# Patient Record
Sex: MALE | Race: WHITE | Employment: OTHER | URBAN - METROPOLITAN AREA
[De-identification: names, ages, dates, MRNs, and addresses within clinical notes are randomized per-mention and may not be internally consistent; named-entity substitution may affect disease eponyms.]

---

## 2022-03-01 ENCOUNTER — APPOINTMENT (OUTPATIENT)
Dept: GENERAL RADIOLOGY | Age: 87
DRG: 522 | End: 2022-03-01
Payer: MEDICARE

## 2022-03-01 ENCOUNTER — HOSPITAL ENCOUNTER (INPATIENT)
Age: 87
LOS: 6 days | Discharge: NURSING, REHAB OR LONG-TERM CARE | DRG: 522 | End: 2022-03-07
Attending: STUDENT IN AN ORGANIZED HEALTH CARE EDUCATION/TRAINING PROGRAM | Admitting: ORTHOPAEDIC SURGERY
Payer: MEDICARE

## 2022-03-01 DIAGNOSIS — S72.001A CLOSED FRACTURE OF RIGHT HIP, INITIAL ENCOUNTER (HCC): Primary | ICD-10-CM

## 2022-03-01 LAB
ABSOLUTE EOS #: 0.1 K/UL (ref 0–0.4)
ABSOLUTE LYMPH #: 0.9 K/UL (ref 1–4.8)
ABSOLUTE MONO #: 1.1 K/UL (ref 0.1–1.3)
ANION GAP SERPL CALCULATED.3IONS-SCNC: 10 MMOL/L (ref 9–17)
BASOPHILS # BLD: 1 % (ref 0–2)
BASOPHILS ABSOLUTE: 0 K/UL (ref 0–0.2)
BUN BLDV-MCNC: 18 MG/DL (ref 8–23)
CALCIUM SERPL-MCNC: 8.5 MG/DL (ref 8.6–10.4)
CHLORIDE BLD-SCNC: 105 MMOL/L (ref 98–107)
CO2: 25 MMOL/L (ref 20–31)
CREAT SERPL-MCNC: 1.23 MG/DL (ref 0.7–1.2)
EOSINOPHILS RELATIVE PERCENT: 1 % (ref 0–4)
GFR AFRICAN AMERICAN: >60 ML/MIN
GFR NON-AFRICAN AMERICAN: 55 ML/MIN
GFR SERPL CREATININE-BSD FRML MDRD: ABNORMAL ML/MIN/{1.73_M2}
GLUCOSE BLD-MCNC: 133 MG/DL (ref 70–99)
HCT VFR BLD CALC: 37.8 % (ref 41–53)
HEMOGLOBIN: 12.6 G/DL (ref 13.5–17.5)
LYMPHOCYTES # BLD: 8 % (ref 24–44)
MCH RBC QN AUTO: 31.9 PG (ref 26–34)
MCHC RBC AUTO-ENTMCNC: 33.4 G/DL (ref 31–37)
MCV RBC AUTO: 95.3 FL (ref 80–100)
MONOCYTES # BLD: 10 % (ref 1–7)
PDW BLD-RTO: 13.2 % (ref 11.5–14.9)
PLATELET # BLD: 219 K/UL (ref 150–450)
PMV BLD AUTO: 7.4 FL (ref 6–12)
POTASSIUM SERPL-SCNC: 4.7 MMOL/L (ref 3.7–5.3)
RBC # BLD: 3.97 M/UL (ref 4.5–5.9)
SARS-COV-2, RAPID: NOT DETECTED
SEG NEUTROPHILS: 80 % (ref 36–66)
SEGMENTED NEUTROPHILS ABSOLUTE COUNT: 8.6 K/UL (ref 1.3–9.1)
SODIUM BLD-SCNC: 140 MMOL/L (ref 135–144)
SPECIMEN DESCRIPTION: NORMAL
WBC # BLD: 10.7 K/UL (ref 3.5–11)

## 2022-03-01 PROCEDURE — 99285 EMERGENCY DEPT VISIT HI MDM: CPT

## 2022-03-01 PROCEDURE — 36415 COLL VENOUS BLD VENIPUNCTURE: CPT

## 2022-03-01 PROCEDURE — 6360000002 HC RX W HCPCS: Performed by: STUDENT IN AN ORGANIZED HEALTH CARE EDUCATION/TRAINING PROGRAM

## 2022-03-01 PROCEDURE — 87635 SARS-COV-2 COVID-19 AMP PRB: CPT

## 2022-03-01 PROCEDURE — 96374 THER/PROPH/DIAG INJ IV PUSH: CPT

## 2022-03-01 PROCEDURE — 93005 ELECTROCARDIOGRAM TRACING: CPT | Performed by: STUDENT IN AN ORGANIZED HEALTH CARE EDUCATION/TRAINING PROGRAM

## 2022-03-01 PROCEDURE — 2580000003 HC RX 258: Performed by: ORTHOPAEDIC SURGERY

## 2022-03-01 PROCEDURE — 80048 BASIC METABOLIC PNL TOTAL CA: CPT

## 2022-03-01 PROCEDURE — 85025 COMPLETE CBC W/AUTO DIFF WBC: CPT

## 2022-03-01 PROCEDURE — 6370000000 HC RX 637 (ALT 250 FOR IP): Performed by: ORTHOPAEDIC SURGERY

## 2022-03-01 PROCEDURE — 71045 X-RAY EXAM CHEST 1 VIEW: CPT

## 2022-03-01 PROCEDURE — 1200000000 HC SEMI PRIVATE

## 2022-03-01 PROCEDURE — 6370000000 HC RX 637 (ALT 250 FOR IP): Performed by: NURSE PRACTITIONER

## 2022-03-01 PROCEDURE — 73502 X-RAY EXAM HIP UNI 2-3 VIEWS: CPT

## 2022-03-01 RX ORDER — OXYCODONE HYDROCHLORIDE 5 MG/1
5 TABLET ORAL EVERY 4 HOURS PRN
Status: DISCONTINUED | OUTPATIENT
Start: 2022-03-01 | End: 2022-03-04

## 2022-03-01 RX ORDER — ACETAMINOPHEN 500 MG
1000 TABLET ORAL EVERY 6 HOURS
Status: DISCONTINUED | OUTPATIENT
Start: 2022-03-01 | End: 2022-03-08 | Stop reason: HOSPADM

## 2022-03-01 RX ORDER — FENTANYL CITRATE 50 UG/ML
25 INJECTION, SOLUTION INTRAMUSCULAR; INTRAVENOUS ONCE
Status: COMPLETED | OUTPATIENT
Start: 2022-03-01 | End: 2022-03-01

## 2022-03-01 RX ORDER — CHLORAL HYDRATE 500 MG
1000 CAPSULE ORAL DAILY
COMMUNITY

## 2022-03-01 RX ORDER — ONDANSETRON 2 MG/ML
4 INJECTION INTRAMUSCULAR; INTRAVENOUS EVERY 6 HOURS PRN
Status: DISCONTINUED | OUTPATIENT
Start: 2022-03-01 | End: 2022-03-08 | Stop reason: HOSPADM

## 2022-03-01 RX ORDER — ONDANSETRON 4 MG/1
4 TABLET, ORALLY DISINTEGRATING ORAL EVERY 8 HOURS PRN
Status: DISCONTINUED | OUTPATIENT
Start: 2022-03-01 | End: 2022-03-08 | Stop reason: HOSPADM

## 2022-03-01 RX ORDER — ATORVASTATIN CALCIUM 80 MG/1
80 TABLET, FILM COATED ORAL NIGHTLY
COMMUNITY

## 2022-03-01 RX ORDER — TAMSULOSIN HYDROCHLORIDE 0.4 MG/1
0.8 CAPSULE ORAL NIGHTLY
Status: DISCONTINUED | OUTPATIENT
Start: 2022-03-01 | End: 2022-03-03

## 2022-03-01 RX ORDER — AMLODIPINE BESYLATE 2.5 MG/1
2.5 TABLET ORAL DAILY
Status: DISCONTINUED | OUTPATIENT
Start: 2022-03-02 | End: 2022-03-08 | Stop reason: HOSPADM

## 2022-03-01 RX ORDER — FENTANYL CITRATE 50 UG/ML
50 INJECTION, SOLUTION INTRAMUSCULAR; INTRAVENOUS ONCE
Status: COMPLETED | OUTPATIENT
Start: 2022-03-01 | End: 2022-03-01

## 2022-03-01 RX ORDER — FINASTERIDE 5 MG/1
5 TABLET, FILM COATED ORAL DAILY
COMMUNITY

## 2022-03-01 RX ORDER — SODIUM CHLORIDE 0.9 % (FLUSH) 0.9 %
5-40 SYRINGE (ML) INJECTION PRN
Status: DISCONTINUED | OUTPATIENT
Start: 2022-03-01 | End: 2022-03-08 | Stop reason: HOSPADM

## 2022-03-01 RX ORDER — M-VIT,TX,IRON,MINS/CALC/FOLIC 27MG-0.4MG
1 TABLET ORAL DAILY
COMMUNITY

## 2022-03-01 RX ORDER — AMLODIPINE BESYLATE 2.5 MG/1
2.5 TABLET ORAL DAILY
COMMUNITY

## 2022-03-01 RX ORDER — SODIUM CHLORIDE 0.9 % (FLUSH) 0.9 %
5-40 SYRINGE (ML) INJECTION EVERY 12 HOURS SCHEDULED
Status: DISCONTINUED | OUTPATIENT
Start: 2022-03-01 | End: 2022-03-08 | Stop reason: HOSPADM

## 2022-03-01 RX ORDER — LISINOPRIL 10 MG/1
10 TABLET ORAL DAILY
COMMUNITY
End: 2022-03-01

## 2022-03-01 RX ORDER — FINASTERIDE 5 MG/1
5 TABLET, FILM COATED ORAL DAILY
Status: DISCONTINUED | OUTPATIENT
Start: 2022-03-02 | End: 2022-03-08 | Stop reason: HOSPADM

## 2022-03-01 RX ORDER — ACETAMINOPHEN 325 MG/1
650 TABLET ORAL EVERY 4 HOURS PRN
Status: DISCONTINUED | OUTPATIENT
Start: 2022-03-01 | End: 2022-03-08 | Stop reason: HOSPADM

## 2022-03-01 RX ORDER — ATORVASTATIN CALCIUM 80 MG/1
80 TABLET, FILM COATED ORAL NIGHTLY
Status: DISCONTINUED | OUTPATIENT
Start: 2022-03-01 | End: 2022-03-08 | Stop reason: HOSPADM

## 2022-03-01 RX ORDER — SODIUM CHLORIDE 9 MG/ML
25 INJECTION, SOLUTION INTRAVENOUS PRN
Status: DISCONTINUED | OUTPATIENT
Start: 2022-03-01 | End: 2022-03-08 | Stop reason: HOSPADM

## 2022-03-01 RX ORDER — MORPHINE SULFATE 2 MG/ML
2 INJECTION, SOLUTION INTRAMUSCULAR; INTRAVENOUS
Status: DISCONTINUED | OUTPATIENT
Start: 2022-03-01 | End: 2022-03-04

## 2022-03-01 RX ORDER — TAMSULOSIN HYDROCHLORIDE 0.4 MG/1
0.8 CAPSULE ORAL NIGHTLY
COMMUNITY

## 2022-03-01 RX ADMIN — METOPROLOL TARTRATE 12.5 MG: 25 TABLET, FILM COATED ORAL at 23:40

## 2022-03-01 RX ADMIN — SODIUM CHLORIDE, PRESERVATIVE FREE 10 ML: 5 INJECTION INTRAVENOUS at 20:41

## 2022-03-01 RX ADMIN — ACETAMINOPHEN 1000 MG: 500 TABLET ORAL at 20:38

## 2022-03-01 RX ADMIN — TAMSULOSIN HYDROCHLORIDE 0.8 MG: 0.4 CAPSULE ORAL at 23:39

## 2022-03-01 RX ADMIN — ATORVASTATIN CALCIUM 80 MG: 80 TABLET, FILM COATED ORAL at 23:39

## 2022-03-01 RX ADMIN — FENTANYL CITRATE 50 MCG: 50 INJECTION INTRAMUSCULAR; INTRAVENOUS at 18:05

## 2022-03-01 RX ADMIN — OXYCODONE HYDROCHLORIDE 5 MG: 5 TABLET ORAL at 23:40

## 2022-03-01 RX ADMIN — FENTANYL CITRATE 25 MCG: 50 INJECTION, SOLUTION INTRAMUSCULAR; INTRAVENOUS at 15:59

## 2022-03-01 ASSESSMENT — PAIN DESCRIPTION - FREQUENCY: FREQUENCY: CONTINUOUS

## 2022-03-01 ASSESSMENT — ENCOUNTER SYMPTOMS
ABDOMINAL PAIN: 0
VOMITING: 0
DIARRHEA: 0
SORE THROAT: 0
EYE PAIN: 0
NAUSEA: 0
COLOR CHANGE: 0
BACK PAIN: 0
SHORTNESS OF BREATH: 0
FACIAL SWELLING: 0
RHINORRHEA: 0
EYE REDNESS: 0
COUGH: 0

## 2022-03-01 ASSESSMENT — PAIN DESCRIPTION - LOCATION
LOCATION: HIP

## 2022-03-01 ASSESSMENT — PAIN DESCRIPTION - ORIENTATION
ORIENTATION: RIGHT

## 2022-03-01 ASSESSMENT — PAIN SCALES - GENERAL
PAINLEVEL_OUTOF10: 6
PAINLEVEL_OUTOF10: 7
PAINLEVEL_OUTOF10: 4
PAINLEVEL_OUTOF10: 4
PAINLEVEL_OUTOF10: 5

## 2022-03-01 ASSESSMENT — PAIN DESCRIPTION - PAIN TYPE
TYPE: ACUTE PAIN
TYPE: ACUTE PAIN

## 2022-03-01 ASSESSMENT — PAIN - FUNCTIONAL ASSESSMENT: PAIN_FUNCTIONAL_ASSESSMENT: 0-10

## 2022-03-01 NOTE — PROGRESS NOTES
Medication History completed: All medications added this encounter. Medications confirmed with [address]      Thank you,  Tere Aguilar, PharmD, BCPS  [phone]

## 2022-03-01 NOTE — ED NOTES
Mode of arrival (squad #, walk in, police, etc) : EMS        Chief complaint(s): Hip pain post fall        Arrival Note (brief scenario, treatment PTA, etc). : Patient was transported to ED [place] this [place] resulting in a fall where pt states he \"thinks he broke or fractured his hip. \" Patients leg is shortened and externally rotated. Pain radiating through hip, calf and foot (when flexing). Patient is alert and oriented, no signs of respiratory or cardiac complications. C= \"Have you ever felt that you should Cut down on your drinking? \"  No  A= \"Have people Annoyed you by criticizing your drinking? \"  No  G= \"Have you ever felt bad or Guilty about your drinking? \"  No  E= \"Have you ever had a drink as an Eye-opener first thing in the morning to steady your nerves or to help a hangover? \"  No      Deferred []      Reason for deferring: N/A    *If yes to two or more: probable alcohol abuse. Zeb Kelly RN  03/01/22 9490

## 2022-03-01 NOTE — ED PROVIDER NOTES
EMERGENCY DEPARTMENT ENCOUNTER    Pt Name: Sohail See  MRN: [id number]  [place] 1934  Date of evaluation: 3/1/22  CHIEF COMPLAINT       Chief Complaint   Patient presents with    Hip Pain     HISTORY OF PRESENT ILLNESS   HPI  40-year-old male previously healthy presents for evaluation of right hip pain. Symptoms started earlier prior to arrival.  Patient had a mechanical fall. He was walking out of the restaurant and he missed a step and tripped falling forward onto his right side. There was a twisting motion. He acute onset pain was unable to get up. Brought to the ER by EMS. He did not hit his head or lose consciousness. No other injuries. Symptoms are moderate and constant. No other recent illness. Pain is described as sharp. REVIEW OF SYSTEMS     Review of Systems   Constitutional: Negative for chills and fatigue. HENT: Negative for facial swelling, nosebleeds, rhinorrhea and sore throat. Eyes: Negative for pain and redness. Respiratory: Negative for cough and shortness of breath. Cardiovascular: Negative for chest pain and leg swelling. Gastrointestinal: Negative for abdominal pain, diarrhea, nausea and vomiting. Genitourinary: Negative for flank pain and hematuria. Musculoskeletal: Positive for arthralgias. Negative for back pain. Skin: Negative for color change and rash. Neurological: Negative for dizziness, tremors, facial asymmetry, speech difficulty, weakness and numbness. PASTMEDICAL HISTORY   No past medical history on file. Past Problem List  Patient Active Problem List   Diagnosis Code    [facility] right[facility] S72.001A     SURGICAL HISTORY     No past surgical history on file.   CURRENT MEDICATIONS       Previous Medications    AMLODIPINE (NORVASC) 2.5 MG TABLET    Take 2.5 mg by mouth daily    ASPIRIN 325 MG EC TABLET    Take 325 mg by mouth daily    ATORVASTATIN (LIPITOR) 80 MG TABLET    Take 80 mg by mouth at bedtime FINASTERIDE (PROSCAR) 5 MG TABLET    Take 5 mg by mouth daily    METOPROLOL TARTRATE (LOPRESSOR) 25 MG TABLET    Take 12.5 mg by mouth 2 times daily     MULTIPLE VITAMINS-MINERALS (THERAPEUTIC MULTIVITAMIN-MINERALS) TABLET    Take 1 tablet by mouth daily    OMEGA-3 FATTY ACIDS (FISH OIL) 1000 MG CAPS    Take 1,000 mg by mouth daily    TAMSULOSIN (FLOMAX) 0.4 MG CAPSULE    Take 0.8 mg by mouth at bedtime     VITAMIN D (CHOLECALCIFEROL) 25 MCG (1000 UT) TABS TABLET    Take 1,000 Units by mouth daily     ALLERGIES     has No Known Allergies. FAMILY HISTORY     has no family status information on file. SOCIAL HISTORY       Social History     Tobacco Use    Smoking status: Not on file    Smokeless tobacco: Not on file   Substance Use Topics    Alcohol use: Not on file    Drug use: Not on file     PHYSICAL EXAM     INITIAL VITALS: BP (!) 140/75   Pulse 92   Resp 17   SpO2 92%    Physical Exam  Vitals and nursing note reviewed. Constitutional:       Appearance: Normal appearance. HENT:      Head: Normocephalic and atraumatic. Nose: Nose normal.   Eyes:      Extraocular Movements: Extraocular movements intact. Pupils: Pupils are equal, round, and reactive to light. Cardiovascular:      Rate and Rhythm: Normal rate and regular rhythm. Pulmonary:      Effort: Pulmonary effort is normal. No respiratory distress. Breath sounds: Normal breath sounds. Abdominal:      General: Abdomen is flat. There is no distension. Palpations: Abdomen is soft. There is no mass. Musculoskeletal:         General: No swelling. Normal range of motion. Cervical back: Normal range of motion. No rigidity. Comments: Tenderness with palpation of the right hip pain with logroll. Neurovascularly intact soft compartments. Skin:     General: Skin is warm and dry. Neurological:      General: No focal deficit present. Mental Status: He is alert. Mental status is at baseline.       Cranial Nerves: No cranial nerve deficit. MEDICAL DECISION MAKIN-year-old male presents for evaluation of right lower extremity pain and tenderness after a mechanical ground-level fall. Will get x-rays to evaluate for fracture, dislocation. X-rays show right-sided femoral neck fracture. Discussed with orthopedics will admit for operative fixation. Discussed with medicine Dr. Aura Chen who will be on consult. CRITICAL CARE:       PROCEDURES:    Procedures    DIAGNOSTIC RESULTS   EKG:All EKG's are interpreted by the Emergency Department Physician who either signs or Co-signs this chart in the absence of a cardiologist.        RADIOLOGY:All plain film, CT, MRI, and formal ultrasound images (except ED bedside ultrasound) are read by the radiologist, see reports below, unless otherwisenoted in MDM or here. XR CHEST PORTABLE   Final Result   No acute airspace disease identified. XR HIP 2-3 VW W PELVIS RIGHT   Final Result   Mildly displaced subcapital right femoral neck fracture. LABS: All lab results were reviewed by myself, and all abnormals are listed below.   Labs Reviewed   CBC WITH AUTO DIFFERENTIAL - Abnormal; Notable for the following components:       Result Value    RBC 3.97 (*)     Hemoglobin 12.6 (*)     Hematocrit 37.8 (*)     Seg Neutrophils 80 (*)     Lymphocytes 8 (*)     Monocytes 10 (*)     Absolute Lymph # 0.90 (*)     All other components within normal limits   BASIC METABOLIC PANEL W/ REFLEX TO MG FOR LOW K - Abnormal; Notable for the following components:    Glucose 133 (*)     CREATININE 1.23 (*)     Calcium 8.5 (*)     GFR Non- 55 (*)     All other components within normal limits       EMERGENCY DEPARTMENTCOURSE:         Vitals:    Vitals:    22 1437   BP: (!) 140/75   Pulse: 92   Resp: 17   SpO2: 92%       The patient was given the following medications while in the emergency department:  Orders Placed This Encounter   Medications    sodium chloride flush 0.9 % injection 5-40 mL    sodium chloride flush 0.9 % injection 5-40 mL    0.9 % sodium chloride infusion    acetaminophen (TYLENOL) tablet 650 mg    OR Linked Order Group     ondansetron (ZOFRAN-ODT) disintegrating tablet 4 mg     ondansetron (ZOFRAN) injection 4 mg    fentaNYL (SUBLIMAZE) injection 25 mcg     CONSULTS:  IP CONSULT TO ORTHOPEDIC SURGERY  IP CONSULT TO INTERNAL MEDICINE    FINAL IMPRESSION      1. Closed fracture of right hip, initial encounter [facility])          DISPOSITION/PLAN   DISPOSITION Admitted 03/01/2022 04:50:10 PM      PATIENT REFERRED TO:  No follow-up provider specified. DISCHARGE MEDICATIONS:  New Prescriptions    No medications on file     The care is provided during an unprecedented national emergency due to the novel coronavirus, COVID 19.   MD Valeria Goetz MD  03/01/22 5456

## 2022-03-01 NOTE — ED NOTES
Report given to [facility]).       Jose Oakes RN  03/01/22 4225

## 2022-03-01 NOTE — ED NOTES
Patient changed into gown. Patient tolerated well. Patient right leg shortened and externally rotated.       Jose Oakes RN  03/01/22 5283 Tanya Moe RN  03/01/22 5300

## 2022-03-02 ENCOUNTER — APPOINTMENT (OUTPATIENT)
Dept: GENERAL RADIOLOGY | Age: 87
DRG: 522 | End: 2022-03-02
Payer: MEDICARE

## 2022-03-02 ENCOUNTER — ANESTHESIA (OUTPATIENT)
Dept: OPERATING ROOM | Age: 87
DRG: 522 | End: 2022-03-02
Payer: MEDICARE

## 2022-03-02 ENCOUNTER — ANESTHESIA EVENT (OUTPATIENT)
Dept: OPERATING ROOM | Age: 87
DRG: 522 | End: 2022-03-02
Payer: MEDICARE

## 2022-03-02 VITALS
RESPIRATION RATE: 21 BRPM | TEMPERATURE: 99 F | DIASTOLIC BLOOD PRESSURE: 55 MMHG | SYSTOLIC BLOOD PRESSURE: 116 MMHG | OXYGEN SATURATION: 99 %

## 2022-03-02 PROBLEM — I25.810 CORONARY ARTERY DISEASE INVOLVING CORONARY BYPASS GRAFT OF NATIVE HEART WITHOUT ANGINA PECTORIS: Status: ACTIVE | Noted: 2022-03-02

## 2022-03-02 PROBLEM — Z95.1 HX OF CABG: Status: ACTIVE | Noted: 2022-03-02

## 2022-03-02 LAB
EKG ATRIAL RATE: 78 BPM
EKG ATRIAL RATE: 84 BPM
EKG P AXIS: 75 DEGREES
EKG P AXIS: 92 DEGREES
EKG P-R INTERVAL: 128 MS
EKG P-R INTERVAL: 132 MS
EKG Q-T INTERVAL: 380 MS
EKG Q-T INTERVAL: 394 MS
EKG QRS DURATION: 88 MS
EKG QRS DURATION: 92 MS
EKG QTC CALCULATION (BAZETT): 449 MS
EKG QTC CALCULATION (BAZETT): 449 MS
EKG R AXIS: 5 DEGREES
EKG R AXIS: 53 DEGREES
EKG T AXIS: 36 DEGREES
EKG VENTRICULAR RATE: 78 BPM
EKG VENTRICULAR RATE: 84 BPM
GLUCOSE BLD-MCNC: 113 MG/DL (ref 75–110)

## 2022-03-02 PROCEDURE — 3700000001 HC ADD 15 MINUTES (ANESTHESIA): Performed by: ORTHOPAEDIC SURGERY

## 2022-03-02 PROCEDURE — 2580000003 HC RX 258: Performed by: ANESTHESIOLOGY

## 2022-03-02 PROCEDURE — 99223 1ST HOSP IP/OBS HIGH 75: CPT | Performed by: INTERNAL MEDICINE

## 2022-03-02 PROCEDURE — 7100000000 HC PACU RECOVERY - FIRST 15 MIN: Performed by: ORTHOPAEDIC SURGERY

## 2022-03-02 PROCEDURE — 2580000003 HC RX 258: Performed by: ORTHOPAEDIC SURGERY

## 2022-03-02 PROCEDURE — 0SRR0J9 REPLACEMENT OF RIGHT HIP JOINT, FEMORAL SURFACE WITH SYNTHETIC SUBSTITUTE, CEMENTED, OPEN APPROACH: ICD-10-PCS | Performed by: ORTHOPAEDIC SURGERY

## 2022-03-02 PROCEDURE — 93010 ELECTROCARDIOGRAM REPORT: CPT | Performed by: INTERNAL MEDICINE

## 2022-03-02 PROCEDURE — C1776 JOINT DEVICE (IMPLANTABLE): HCPCS | Performed by: ORTHOPAEDIC SURGERY

## 2022-03-02 PROCEDURE — 6360000002 HC RX W HCPCS: Performed by: ANESTHESIOLOGY

## 2022-03-02 PROCEDURE — 3700000000 HC ANESTHESIA ATTENDED CARE: Performed by: ORTHOPAEDIC SURGERY

## 2022-03-02 PROCEDURE — 99221 1ST HOSP IP/OBS SF/LOW 40: CPT | Performed by: ORTHOPAEDIC SURGERY

## 2022-03-02 PROCEDURE — 3600000003 HC SURGERY LEVEL 3 BASE: Performed by: ORTHOPAEDIC SURGERY

## 2022-03-02 PROCEDURE — 2709999900 HC NON-CHARGEABLE SUPPLY: Performed by: ORTHOPAEDIC SURGERY

## 2022-03-02 PROCEDURE — 2580000003 HC RX 258: Performed by: INTERNAL MEDICINE

## 2022-03-02 PROCEDURE — 27236 TREAT THIGH FRACTURE: CPT | Performed by: ORTHOPAEDIC SURGERY

## 2022-03-02 PROCEDURE — C1713 ANCHOR/SCREW BN/BN,TIS/BN: HCPCS | Performed by: ORTHOPAEDIC SURGERY

## 2022-03-02 PROCEDURE — 7100000001 HC PACU RECOVERY - ADDTL 15 MIN: Performed by: ORTHOPAEDIC SURGERY

## 2022-03-02 PROCEDURE — 73501 X-RAY EXAM HIP UNI 1 VIEW: CPT

## 2022-03-02 PROCEDURE — 82947 ASSAY GLUCOSE BLOOD QUANT: CPT

## 2022-03-02 PROCEDURE — 3600000013 HC SURGERY LEVEL 3 ADDTL 15MIN: Performed by: ORTHOPAEDIC SURGERY

## 2022-03-02 PROCEDURE — 93005 ELECTROCARDIOGRAM TRACING: CPT | Performed by: NURSE PRACTITIONER

## 2022-03-02 PROCEDURE — 6370000000 HC RX 637 (ALT 250 FOR IP): Performed by: NURSE PRACTITIONER

## 2022-03-02 PROCEDURE — 2500000003 HC RX 250 WO HCPCS: Performed by: ANESTHESIOLOGY

## 2022-03-02 PROCEDURE — 2060000000 HC ICU INTERMEDIATE R&B

## 2022-03-02 PROCEDURE — 6370000000 HC RX 637 (ALT 250 FOR IP): Performed by: ORTHOPAEDIC SURGERY

## 2022-03-02 DEVICE — VERSYS DISTAL CENTRALIZER 11MM: Type: IMPLANTABLE DEVICE | Site: HIP | Status: FUNCTIONAL

## 2022-03-02 DEVICE — INSERT FEM NK L-6MM TAPR HIP CO CHROM MOLYBDENUM ALLY ENDO: Type: IMPLANTABLE DEVICE | Site: HIP | Status: FUNCTIONAL

## 2022-03-02 DEVICE — STEM FEM DIA9MM STD OFFSET HIP CO CHROM ECHO FX: Type: IMPLANTABLE DEVICE | Site: HIP | Status: FUNCTIONAL

## 2022-03-02 DEVICE — COMPONENT UPLR DIA49MM HIP CO CHROM MOLYBDENUM ALLY MOD: Type: IMPLANTABLE DEVICE | Site: HIP | Status: FUNCTIONAL

## 2022-03-02 DEVICE — CEMENT BNE 40GM W/ GENT HI VISC RADPQ FOR REV SURG: Type: IMPLANTABLE DEVICE | Site: HIP | Status: FUNCTIONAL

## 2022-03-02 RX ORDER — CEFAZOLIN SODIUM 1 G/3ML
INJECTION, POWDER, FOR SOLUTION INTRAMUSCULAR; INTRAVENOUS PRN
Status: DISCONTINUED | OUTPATIENT
Start: 2022-03-02 | End: 2022-03-02 | Stop reason: SDUPTHER

## 2022-03-02 RX ORDER — MEPERIDINE HYDROCHLORIDE 25 MG/ML
12.5 INJECTION INTRAMUSCULAR; INTRAVENOUS; SUBCUTANEOUS EVERY 5 MIN PRN
Status: DISCONTINUED | OUTPATIENT
Start: 2022-03-02 | End: 2022-03-02 | Stop reason: HOSPADM

## 2022-03-02 RX ORDER — ONDANSETRON 2 MG/ML
4 INJECTION INTRAMUSCULAR; INTRAVENOUS
Status: DISCONTINUED | OUTPATIENT
Start: 2022-03-02 | End: 2022-03-02 | Stop reason: HOSPADM

## 2022-03-02 RX ORDER — ROCURONIUM BROMIDE 10 MG/ML
INJECTION, SOLUTION INTRAVENOUS PRN
Status: DISCONTINUED | OUTPATIENT
Start: 2022-03-02 | End: 2022-03-02 | Stop reason: SDUPTHER

## 2022-03-02 RX ORDER — DIPHENHYDRAMINE HYDROCHLORIDE 50 MG/ML
12.5 INJECTION INTRAMUSCULAR; INTRAVENOUS
Status: DISCONTINUED | OUTPATIENT
Start: 2022-03-02 | End: 2022-03-02 | Stop reason: HOSPADM

## 2022-03-02 RX ORDER — SODIUM CHLORIDE 9 MG/ML
INJECTION, SOLUTION INTRAVENOUS CONTINUOUS PRN
Status: DISCONTINUED | OUTPATIENT
Start: 2022-03-02 | End: 2022-03-02 | Stop reason: SDUPTHER

## 2022-03-02 RX ORDER — SODIUM CHLORIDE 9 MG/ML
25 INJECTION, SOLUTION INTRAVENOUS PRN
Status: DISCONTINUED | OUTPATIENT
Start: 2022-03-02 | End: 2022-03-02 | Stop reason: HOSPADM

## 2022-03-02 RX ORDER — SODIUM CHLORIDE 0.9 % (FLUSH) 0.9 %
5-40 SYRINGE (ML) INJECTION PRN
Status: DISCONTINUED | OUTPATIENT
Start: 2022-03-02 | End: 2022-03-02 | Stop reason: HOSPADM

## 2022-03-02 RX ORDER — FENTANYL CITRATE 50 UG/ML
INJECTION, SOLUTION INTRAMUSCULAR; INTRAVENOUS PRN
Status: DISCONTINUED | OUTPATIENT
Start: 2022-03-02 | End: 2022-03-02 | Stop reason: SDUPTHER

## 2022-03-02 RX ORDER — SUCCINYLCHOLINE/SOD CL,ISO/PF 200MG/10ML
SYRINGE (ML) INTRAVENOUS PRN
Status: DISCONTINUED | OUTPATIENT
Start: 2022-03-02 | End: 2022-03-02 | Stop reason: SDUPTHER

## 2022-03-02 RX ORDER — FENTANYL CITRATE 50 UG/ML
25 INJECTION, SOLUTION INTRAMUSCULAR; INTRAVENOUS ONCE
Status: DISCONTINUED | OUTPATIENT
Start: 2022-03-02 | End: 2022-03-04

## 2022-03-02 RX ORDER — SODIUM CHLORIDE 0.9 % (FLUSH) 0.9 %
5-40 SYRINGE (ML) INJECTION EVERY 12 HOURS SCHEDULED
Status: DISCONTINUED | OUTPATIENT
Start: 2022-03-02 | End: 2022-03-02 | Stop reason: HOSPADM

## 2022-03-02 RX ORDER — SODIUM CHLORIDE 9 MG/ML
INJECTION, SOLUTION INTRAVENOUS CONTINUOUS
Status: DISCONTINUED | OUTPATIENT
Start: 2022-03-02 | End: 2022-03-08 | Stop reason: HOSPADM

## 2022-03-02 RX ORDER — EPHEDRINE SULFATE/0.9% NACL/PF 50 MG/5 ML
SYRINGE (ML) INTRAVENOUS PRN
Status: DISCONTINUED | OUTPATIENT
Start: 2022-03-02 | End: 2022-03-02 | Stop reason: SDUPTHER

## 2022-03-02 RX ORDER — PROPOFOL 10 MG/ML
INJECTION, EMULSION INTRAVENOUS PRN
Status: DISCONTINUED | OUTPATIENT
Start: 2022-03-02 | End: 2022-03-02 | Stop reason: SDUPTHER

## 2022-03-02 RX ADMIN — HYDROMORPHONE HYDROCHLORIDE 0.5 MG: 1 INJECTION, SOLUTION INTRAMUSCULAR; INTRAVENOUS; SUBCUTANEOUS at 20:54

## 2022-03-02 RX ADMIN — SODIUM CHLORIDE: 9 INJECTION, SOLUTION INTRAVENOUS at 15:22

## 2022-03-02 RX ADMIN — PROPOFOL 150 MG: 10 INJECTION, EMULSION INTRAVENOUS at 17:54

## 2022-03-02 RX ADMIN — METOPROLOL TARTRATE 12.5 MG: 25 TABLET, FILM COATED ORAL at 09:48

## 2022-03-02 RX ADMIN — SODIUM CHLORIDE, PRESERVATIVE FREE 10 ML: 5 INJECTION INTRAVENOUS at 09:48

## 2022-03-02 RX ADMIN — SODIUM CHLORIDE: 9 INJECTION, SOLUTION INTRAVENOUS at 17:50

## 2022-03-02 RX ADMIN — ROCURONIUM BROMIDE 5 MG: 10 INJECTION, SOLUTION INTRAVENOUS at 17:55

## 2022-03-02 RX ADMIN — Medication 140 MG: at 17:53

## 2022-03-02 RX ADMIN — Medication 10 MG: at 18:17

## 2022-03-02 RX ADMIN — FENTANYL CITRATE 100 MCG: 50 INJECTION, SOLUTION INTRAMUSCULAR; INTRAVENOUS at 17:53

## 2022-03-02 RX ADMIN — FINASTERIDE 5 MG: 5 TABLET, FILM COATED ORAL at 09:47

## 2022-03-02 RX ADMIN — ACETAMINOPHEN 1000 MG: 500 TABLET ORAL at 07:01

## 2022-03-02 RX ADMIN — CEFAZOLIN 2000 MG: 1 INJECTION, POWDER, FOR SOLUTION INTRAMUSCULAR; INTRAVENOUS at 18:03

## 2022-03-02 RX ADMIN — Medication 20 MG: at 18:46

## 2022-03-02 RX ADMIN — Medication 20 MG: at 19:08

## 2022-03-02 ASSESSMENT — PULMONARY FUNCTION TESTS
PIF_VALUE: 14
PIF_VALUE: 5
PIF_VALUE: 1
PIF_VALUE: 12
PIF_VALUE: 13
PIF_VALUE: 2
PIF_VALUE: 14
PIF_VALUE: 13
PIF_VALUE: 5
PIF_VALUE: 15
PIF_VALUE: 13
PIF_VALUE: 12
PIF_VALUE: 14
PIF_VALUE: 1
PIF_VALUE: 5
PIF_VALUE: 2
PIF_VALUE: 14
PIF_VALUE: 13
PIF_VALUE: 13
PIF_VALUE: 14
PIF_VALUE: 13
PIF_VALUE: 14
PIF_VALUE: 13
PIF_VALUE: 17
PIF_VALUE: 2
PIF_VALUE: 14
PIF_VALUE: 14
PIF_VALUE: 16
PIF_VALUE: 14
PIF_VALUE: 13
PIF_VALUE: 14
PIF_VALUE: 4
PIF_VALUE: 15
PIF_VALUE: 14
PIF_VALUE: 14
PIF_VALUE: 2
PIF_VALUE: 16
PIF_VALUE: 14
PIF_VALUE: 5
PIF_VALUE: 13
PIF_VALUE: 5
PIF_VALUE: 14
PIF_VALUE: 5
PIF_VALUE: 14
PIF_VALUE: 14
PIF_VALUE: 4
PIF_VALUE: 5
PIF_VALUE: 5
PIF_VALUE: 14
PIF_VALUE: 5
PIF_VALUE: 4
PIF_VALUE: 14
PIF_VALUE: 2
PIF_VALUE: 1
PIF_VALUE: 14
PIF_VALUE: 12
PIF_VALUE: 13
PIF_VALUE: 14
PIF_VALUE: 14
PIF_VALUE: 5
PIF_VALUE: 4
PIF_VALUE: 4
PIF_VALUE: 5
PIF_VALUE: 15
PIF_VALUE: 13
PIF_VALUE: 14
PIF_VALUE: 16
PIF_VALUE: 14
PIF_VALUE: 4
PIF_VALUE: 5
PIF_VALUE: 2
PIF_VALUE: 6
PIF_VALUE: 2
PIF_VALUE: 14
PIF_VALUE: 13
PIF_VALUE: 17
PIF_VALUE: 16
PIF_VALUE: 14
PIF_VALUE: 13
PIF_VALUE: 13
PIF_VALUE: 2
PIF_VALUE: 14
PIF_VALUE: 11
PIF_VALUE: 13
PIF_VALUE: 6
PIF_VALUE: 14
PIF_VALUE: 13
PIF_VALUE: 14
PIF_VALUE: 13
PIF_VALUE: 14
PIF_VALUE: 2
PIF_VALUE: 14
PIF_VALUE: 3
PIF_VALUE: 15
PIF_VALUE: 4
PIF_VALUE: 14
PIF_VALUE: 13
PIF_VALUE: 13
PIF_VALUE: 14
PIF_VALUE: 14
PIF_VALUE: 6
PIF_VALUE: 13
PIF_VALUE: 13
PIF_VALUE: 16
PIF_VALUE: 13
PIF_VALUE: 4
PIF_VALUE: 5
PIF_VALUE: 14
PIF_VALUE: 5
PIF_VALUE: 13
PIF_VALUE: 6
PIF_VALUE: 18
PIF_VALUE: 14
PIF_VALUE: 13
PIF_VALUE: 2
PIF_VALUE: 2
PIF_VALUE: 5
PIF_VALUE: 6
PIF_VALUE: 14
PIF_VALUE: 13

## 2022-03-02 ASSESSMENT — ENCOUNTER SYMPTOMS
SHORTNESS OF BREATH: 0
STRIDOR: 0

## 2022-03-02 ASSESSMENT — PAIN SCALES - GENERAL
PAINLEVEL_OUTOF10: 3
PAINLEVEL_OUTOF10: 4
PAINLEVEL_OUTOF10: 0

## 2022-03-02 ASSESSMENT — LIFESTYLE VARIABLES: SMOKING_STATUS: 0

## 2022-03-02 NOTE — FLOWSHEET NOTE
Donald Alejo, pt's spouse at bedside. Patient shared he is having surgery later today. Writer provided support and prayer. 03/02/22 1146   Encounter Summary   Services provided to: Patient and family together   Referral/Consult From: [facility]   Support System Spouse   Continue Visiting   (3-2-22)   Complexity of Encounter Moderate   Length of Encounter 15 minutes   Spiritual Assessment Completed Yes   Routine   Type Pre-procedure   Spiritual/Jewish   Type Spiritual support   Assessment Anxious   Intervention Active listening;Explored feelings, thoughts, concerns;Prayer;Sustaining presence/ Ministry of presence; Discussed illness/injury and it's impact   Outcome Expressed gratitude;Engaged in conversation;Expressed feelings/needs/concerns;Receptive
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
## 2022-03-02 NOTE — PLAN OF CARE
Problem: Falls - Risk of:  Goal: Will remain free from falls  Description: Will remain free from falls  Outcome: Met This Shift  Goal: Absence of physical injury  Description: Absence of physical injury  Outcome: Met This Shift     Problem: Pain:  Goal: Pain level will decrease  Description: Pain level will decrease  Outcome: Met This Shift  Goal: Control of acute pain  Description: Control of acute pain  Outcome: Met This Shift  Goal: Control of chronic pain  Description: Control of chronic pain  Outcome: Met This Shift     Problem: Skin Integrity:  Goal: Will show no infection signs and symptoms  Description: Will show no infection signs and symptoms  Outcome: Met This Shift  Goal: Absence of new skin breakdown  Description: Absence of new skin breakdown  Outcome: Met This Shift  Goal: Demonstration of wound healing without infection will improve  Description: Demonstration of wound healing without infection will improve  Outcome: Met This Shift  Goal: Risk for impaired skin integrity will decrease  Description: Risk for impaired skin integrity will decrease  Outcome: Met This Shift     Problem:  Activity:  Goal: Ability to ambulate will improve  Description: Ability to ambulate will improve  Outcome: Met This Shift  Goal: Ability to perform activities at highest level will improve  Description: Ability to perform activities at highest level will improve  Outcome: Met This Shift     Problem: Health Behavior:  Goal: Identification of resources available to assist in meeting health care needs will improve  Description: Identification of resources available to assist in meeting health care needs will improve  Outcome: Met This Shift     Problem: Nutritional:  Goal: Ability to attain and maintain optimal nutritional status will improve  Description: Ability to attain and maintain optimal nutritional status will improve  Outcome: Met This Shift     Problem: Physical Regulation:  Goal: Will remain free from infection  Description: Will remain free from infection  Outcome: Met This Shift  Goal: Postoperative complications will be avoided or minimized  Description: Postoperative complications will be avoided or minimized  Outcome: Met This Shift  Goal: Diagnostic test results will improve  Description: Diagnostic test results will improve  Outcome: Met This Shift     Problem: Respiratory:  Goal: Ability to maintain adequate ventilation will improve  Description: Ability to maintain adequate ventilation will improve  Outcome: Met This Shift     Problem: Safety:  Goal: Ability to remain free from injury will improve  Description: Ability to remain free from injury will improve  Outcome: Met This Shift     Problem: Self-Care:  Goal: Ability to meet self-care needs will improve  Description: Ability to meet self-care needs will improve  Outcome: Met This Shift     Problem: Self-Concept:  Goal: Ability to maintain and perform role responsibilities to the fullest extent possible will improve  Description: Ability to maintain and perform role responsibilities to the fullest extent possible will improve  Outcome: Met This Shift  Goal: Verbalizations of decreased anxiety will increase  Description: Verbalizations of decreased anxiety will increase  Outcome: Met This Shift     Problem: Sensory:  Goal: Pain level will decrease  Description: Pain level will decrease  Outcome: Met This Shift     Problem: Tissue Perfusion:  Goal: Peripheral tissue perfusion will improve  Description: Peripheral tissue perfusion will improve  Outcome: Met This Shift  Goal: Risk of venous thrombosis will decrease  Description: Risk of venous thrombosis will decrease  Outcome: Met This Shift     Problem: Urinary Elimination:  Goal: Ability to reestablish a normal urinary elimination pattern will improve - after catheter removal  Description: Ability to reestablish a normal urinary elimination pattern will improve  Outcome: Met This Shift
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
## 2022-03-02 NOTE — FLOWSHEET NOTE
Found sitting up on side of bed. Voided per urinal. Stated \"It's sore\" when asked \"How does your hip feel? \" repositioned in bed. Tolerated well.

## 2022-03-02 NOTE — ANESTHESIA PRE PROCEDURE
Department of Anesthesiology  Preprocedure Note       Name:  Venkatesh Finley   Age:  80 y.o.  :  1934                                          MRN:  [id number]         Date:  3/2/2022      Surgeon: Luz Marina Wild):  Monica Obando MD    Procedure: Procedure(s):  HIP HEMIARTHROPLASTY    Medications prior to admission:   Prior to Admission medications    Medication Sig Start Date End Date Taking?  Authorizing Provider   aspirin 325 MG EC tablet Take 325 mg by mouth daily   Yes Historical Provider, MD   atorvastatin (LIPITOR) 80 MG tablet Take 80 mg by mouth at bedtime   Yes Historical Provider, MD   finasteride (PROSCAR) 5 MG tablet Take 5 mg by mouth daily   Yes Historical Provider, MD   Omega-3 Fatty Acids (FISH OIL) 1000 MG CAPS Take 1,000 mg by mouth daily   Yes Historical Provider, MD   tamsulosin (FLOMAX) 0.4 MG capsule Take 0.8 mg by mouth at bedtime    Yes Historical Provider, MD   metoprolol tartrate (LOPRESSOR) 25 MG tablet Take 12.5 mg by mouth 2 times daily    Yes Historical Provider, MD   Multiple Vitamins-Minerals (THERAPEUTIC MULTIVITAMIN-MINERALS) tablet Take 1 tablet by mouth daily   Yes Historical Provider, MD   vitamin D (CHOLECALCIFEROL) 25 MCG (1000 UT) TABS tablet Take 1,000 Units by mouth daily   Yes Historical Provider, MD   amLODIPine (NORVASC) 2.5 MG tablet Take 2.5 mg by mouth daily   Yes Historical Provider, MD       Current medications:    Current Facility-Administered Medications   Medication Dose Route Frequency Provider Last Rate Last Admin    [MAR Hold] 0.9 % sodium chloride infusion   IntraVENous Continuous Izale MD Norma 75 mL/hr at 22 1522 New Bag at 22 1522    [MAR Hold] sodium chloride flush 0.9 % injection 5-40 mL  5-40 mL IntraVENous 2 times per day Monica Obando MD   10 mL at 22 0948    [MAR Hold] sodium chloride flush 0.9 % injection 5-40 mL  5-40 mL IntraVENous PRN Monica Obando MD        [facility] Hold] 0.9 % sodium chloride infusion  25 mL IntraVENous PRN MD Onofre Hicks Hold] acetaminophen (TYLENOL) tablet 650 mg  650 mg Oral Q4H PRN MD Onofre Hicks Hold] ondansetron (ZOFRAN-ODT) disintegrating tablet 4 mg  4 mg Oral Q8H PRN Lord Angie MD        Mikhail Mcdonald Cony Hallman Hold] ondansetron (ZOFRAN) injection 4 mg  4 mg IntraVENous Q6H PRN MD Onofre Hicks Hold] morphine (PF) injection 2 mg  2 mg IntraVENous Q2H PRN MD Onofre Hicks Hold] acetaminophen (TYLENOL) tablet 1,000 mg  1,000 mg Oral Q6H Madeline Royal MD   1,000 mg at 03/02/22 0701    [MAR Hold] oxyCODONE (ROXICODONE) immediate release tablet 5 mg  5 mg Oral Q4H PRN Madeline Royal MD   5 mg at 03/01/22 2340    [MAR Hold] amLODIPine (NORVASC) tablet 2.5 mg  2.5 mg Oral Daily Anna Bills, APRN - CNP        [MAR Hold] atorvastatin (LIPITOR) tablet 80 mg  80 mg Oral Nightly Anna Cain, APRN - CNP   80 mg at 03/01/22 2339    [MAR Hold] finasteride (PROSCAR) tablet 5 mg  5 mg Oral Daily Anna Reis, APRN - CNP   5 mg at 03/02/22 0947    [MAR Hold] metoprolol tartrate (LOPRESSOR) tablet 12.5 mg  12.5 mg Oral BID Anna Cain, APRN - CNP   12.5 mg at 03/02/22 0948    [MAR Hold] tamsulosin (FLOMAX) capsule 0.8 mg  0.8 mg Oral Nightly Anna Minayas, APRN - CNP   0.8 mg at 03/01/22 2339       Allergies:  No Known Allergies    Problem List:    Patient Active Problem List   Diagnosis Code    Hip fracture, right, closed, initial encounter ([facility] [address] S72.001A    Coronary artery disease involving coronary bypass graft of native heart without angina pectoris I25.810    Hx of CABG Z95.1       Past Medical History:  History reviewed. No pertinent past medical history. Past Surgical History:  History reviewed. No pertinent surgical history.     Social History:    Social History     Tobacco Use    Smoking status: Not on file    Smokeless tobacco: Not on file   Substance Use Topics    Alcohol use: Not on file                                Counseling given: Not Answered      Vital Signs (Current):   Vitals:    03/02/22 0727 03/02/22 0924 03/02/22 1245 03/02/22 1504   BP: (!) 141/60 (!) 111/50 (!) 121/59 134/62   Pulse: 73 63 66 67   Resp: 18  16 18   Temp: 99.2 °F (37.3 °C)  98.9 °F (37.2 °C) 98.1 °F (36.7 °C)   TempSrc: Oral  Oral Oral   SpO2: 93% 94% 94% 94%   Weight:       Height:                                                  BP Readings from Last 3 Encounters:   03/02/22 134/62       NPO Status:                                                                                 BMI:   Wt Readings from Last 3 Encounters:   03/01/22 160 lb (72.6 kg)     Body mass index is 22.32 kg/m². CBC:   Lab Results   Component Value Date    WBC 10.7 03/01/2022    RBC 3.97 03/01/2022    HGB 12.6 03/01/2022    HCT 37.8 03/01/2022    MCV 95.3 03/01/2022    RDW 13.2 03/01/2022     03/01/2022       CMP:   Lab Results   Component Value Date     03/01/2022    K 4.7 03/01/2022     03/01/2022    CO2 25 03/01/2022    BUN 18 03/01/2022    CREATININE 1.23 03/01/2022    GFRAA >60 03/01/2022    LABGLOM 55 03/01/2022    GLUCOSE 133 03/01/2022    CALCIUM 8.5 03/01/2022       POC Tests: No results for input(s): POCGLU, POCNA, POCK, POCCL, POCBUN, POCHEMO, POCHCT in the last 72 hours.     Coags: No results found for: PROTIME, INR, APTT    HCG (If Applicable): No results found for: PREGTESTUR, PREGSERUM, HCG, HCGQUANT     ABGs: No results found for: PHART, PO2ART, UJH9EZR, LNG6QRY, BEART, E9ARJVMM     Type & Screen (If Applicable):  No results found for: LABABO, LABRH    Drug/Infectious Status (If Applicable):  No results found for: HIV, HEPCAB    COVID-19 Screening (If Applicable):   Lab Results   Component Value Date    COVID19 Not Detected 03/01/2022           Anesthesia Evaluation  Patient summary reviewed and Nursing notes reviewed no history of anesthetic complications:   Airway: Mallampati: II  TM distance: >3 FB   Neck ROM: full  Mouth opening: > = 3 FB Dental:    (+) partials      Pulmonary:Negative Pulmonary ROS and normal exam  breath sounds clear to auscultation      (-) pneumonia, COPD, asthma, shortness of breath, recent URI, sleep apnea, rhonchi, wheezes, rales, stridor, not a current smoker and no decreased breath sounds          Patient did not smoke on day of surgery. Cardiovascular:Negative CV ROS  Exercise tolerance: good (>4 METS),   (+) past MI: no interval change, CAD: no interval change, CABG/stent: no interval change,     (-) pacemaker, hypertension, valvular problems/murmurs, dysrhythmias,  angina,  CHF, orthopnea, PND,  GREGORY, murmur, weak pulses,  friction rub, systolic click, carotid bruit,  JVD, peripheral edema, no pulmonary hypertension and no hyperlipidemia    ECG reviewed  Rhythm: regular  Rate: normal           Beta Blocker:  Dose within 24 Hrs         Neuro/Psych:   Negative Neuro/Psych ROS     (-) seizures, neuromuscular disease, TIA, CVA, headaches, psychiatric history and depression/anxiety            GI/Hepatic/Renal: Neg GI/Hepatic/Renal ROS       (-) hiatal hernia, GERD, PUD, hepatitis, liver disease, no renal disease, bowel prep and no morbid obesity       Endo/Other: Negative Endo/Other ROS   (+) no malignancy/cancer. (-) diabetes mellitus, hypothyroidism, hyperthyroidism, blood dyscrasia, arthritis, no electrolyte abnormalities, no malignancy/cancer               Abdominal:             Vascular: negative vascular ROS. - PVD, DVT and PE. Other Findings:           Anesthesia Plan      general     ASA 3 - emergent       Induction: intravenous. MIPS: Postoperative opioids intended and Prophylactic antiemetics administered. Anesthetic plan and risks discussed with patient. Plan discussed with CRNA.                   Edi Yip MD   3/2/2022
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
## 2022-03-02 NOTE — CARE COORDINATION
CASE MANAGEMENT NOTE:    Admission Date:  3/1/2022 Oksana Chapin is a 80 y.o.  male    Admitted for : Closed fracture of right hip, initial encounter ([facility] [address] [S72.001A]  Hip fracture, right, closed, initial encounter ([facility] [address] Yaya Lisa    Met with:  Patient and Family    PCP:  Vinita Agustin. Priya Roque MD                                Insurance:  [phone]      Is patient alert and oriented at time of discussion:  Yes    Current Residence/ Living Arrangements:  independently at home with spouse            Current Services PTA:  No    Does patient go to outpatient dialysis: No  If yes, location and chair time:     Is patient agreeable to VNS: No    Freedom of choice provided:  No    List of [address] provided: No    VNS chosen:  No    DME:  none    Home Oxygen: No    Nebulizer: No    CPAP/BIPAP: No    Supplier: N/A    Potential Assistance Needed: Yes, will likely need SNF awaiting OR and PT/OT recommendations. SNF needed: No    Freedom of choice and list provided: No    Pharmacy:  [facility] in [facility]       Does Patient want to use MEDS to BEDS? Yes    Is patient currently receiving oral anticoagulation therapy? No    [facility] the [facility] [place] with Readmission Risk Score greater than 14%? No  If yes, pt needs a follow up appointment made within 7 days. Family Members/Caregivers that pt would like involved in their care:    Yes    If yes, list name here:  Kristyn Mckinnon, spouse.     Transportation Provider:  Patient             Discharge Plan:  Home                  Electronically signed by: Natividad Turner RN on 3/2/2022 at 9:11 AM

## 2022-03-02 NOTE — CARE COORDINATION
Pt admitted to room 2061,resting in bed. Oriented to room  and surroundings,call light within reach.

## 2022-03-03 LAB
ANION GAP SERPL CALCULATED.3IONS-SCNC: 10 MMOL/L (ref 9–17)
BUN BLDV-MCNC: 21 MG/DL (ref 8–23)
CALCIUM SERPL-MCNC: 8.1 MG/DL (ref 8.6–10.4)
CHLORIDE BLD-SCNC: 106 MMOL/L (ref 98–107)
CO2: 22 MMOL/L (ref 20–31)
CREAT SERPL-MCNC: 1.07 MG/DL (ref 0.7–1.2)
GFR AFRICAN AMERICAN: >60 ML/MIN
GFR NON-AFRICAN AMERICAN: >60 ML/MIN
GFR SERPL CREATININE-BSD FRML MDRD: ABNORMAL ML/MIN/{1.73_M2}
GLUCOSE BLD-MCNC: 162 MG/DL (ref 70–99)
HCT VFR BLD CALC: 35.5 % (ref 41–53)
HEMOGLOBIN: 11.8 G/DL (ref 13.5–17.5)
MCH RBC QN AUTO: 31.6 PG (ref 26–34)
MCHC RBC AUTO-ENTMCNC: 33.2 G/DL (ref 31–37)
MCV RBC AUTO: 95.2 FL (ref 80–100)
PDW BLD-RTO: 13 % (ref 11.5–14.9)
PLATELET # BLD: 201 K/UL (ref 150–450)
PMV BLD AUTO: 7.9 FL (ref 6–12)
POTASSIUM SERPL-SCNC: 4.3 MMOL/L (ref 3.7–5.3)
RBC # BLD: 3.73 M/UL (ref 4.5–5.9)
SODIUM BLD-SCNC: 138 MMOL/L (ref 135–144)
WBC # BLD: 12.2 K/UL (ref 3.5–11)

## 2022-03-03 PROCEDURE — 36415 COLL VENOUS BLD VENIPUNCTURE: CPT

## 2022-03-03 PROCEDURE — 97530 THERAPEUTIC ACTIVITIES: CPT

## 2022-03-03 PROCEDURE — 97166 OT EVAL MOD COMPLEX 45 MIN: CPT

## 2022-03-03 PROCEDURE — 2580000003 HC RX 258: Performed by: ORTHOPAEDIC SURGERY

## 2022-03-03 PROCEDURE — 6360000002 HC RX W HCPCS: Performed by: ORTHOPAEDIC SURGERY

## 2022-03-03 PROCEDURE — 97535 SELF CARE MNGMENT TRAINING: CPT

## 2022-03-03 PROCEDURE — 99232 SBSQ HOSP IP/OBS MODERATE 35: CPT | Performed by: INTERNAL MEDICINE

## 2022-03-03 PROCEDURE — 99222 1ST HOSP IP/OBS MODERATE 55: CPT | Performed by: PHYSICAL MEDICINE & REHABILITATION

## 2022-03-03 PROCEDURE — 2060000000 HC ICU INTERMEDIATE R&B

## 2022-03-03 PROCEDURE — 97162 PT EVAL MOD COMPLEX 30 MIN: CPT

## 2022-03-03 PROCEDURE — 6370000000 HC RX 637 (ALT 250 FOR IP): Performed by: ORTHOPAEDIC SURGERY

## 2022-03-03 PROCEDURE — 80048 BASIC METABOLIC PNL TOTAL CA: CPT

## 2022-03-03 PROCEDURE — 85027 COMPLETE CBC AUTOMATED: CPT

## 2022-03-03 RX ORDER — TAMSULOSIN HYDROCHLORIDE 0.4 MG/1
0.8 CAPSULE ORAL DAILY
Status: DISCONTINUED | OUTPATIENT
Start: 2022-03-03 | End: 2022-03-08 | Stop reason: HOSPADM

## 2022-03-03 RX ADMIN — ACETAMINOPHEN 1000 MG: 500 TABLET ORAL at 13:52

## 2022-03-03 RX ADMIN — CEFAZOLIN 2000 MG: 10 INJECTION, POWDER, FOR SOLUTION INTRAVENOUS at 11:11

## 2022-03-03 RX ADMIN — SODIUM CHLORIDE: 9 INJECTION, SOLUTION INTRAVENOUS at 11:05

## 2022-03-03 RX ADMIN — SODIUM CHLORIDE, PRESERVATIVE FREE 10 ML: 5 INJECTION INTRAVENOUS at 10:33

## 2022-03-03 RX ADMIN — CEFAZOLIN 2000 MG: 10 INJECTION, POWDER, FOR SOLUTION INTRAVENOUS at 03:30

## 2022-03-03 RX ADMIN — AMLODIPINE BESYLATE 2.5 MG: 2.5 TABLET ORAL at 10:29

## 2022-03-03 RX ADMIN — ENOXAPARIN SODIUM 40 MG: 100 INJECTION SUBCUTANEOUS at 10:40

## 2022-03-03 RX ADMIN — TAMSULOSIN HYDROCHLORIDE 0.8 MG: 0.4 CAPSULE ORAL at 13:53

## 2022-03-03 RX ADMIN — OXYCODONE HYDROCHLORIDE 5 MG: 5 TABLET ORAL at 09:38

## 2022-03-03 RX ADMIN — CEFAZOLIN 2000 MG: 10 INJECTION, POWDER, FOR SOLUTION INTRAVENOUS at 18:49

## 2022-03-03 RX ADMIN — METOPROLOL TARTRATE 12.5 MG: 25 TABLET, FILM COATED ORAL at 20:29

## 2022-03-03 RX ADMIN — ATORVASTATIN CALCIUM 80 MG: 80 TABLET, FILM COATED ORAL at 20:30

## 2022-03-03 RX ADMIN — FINASTERIDE 5 MG: 5 TABLET, FILM COATED ORAL at 10:28

## 2022-03-03 RX ADMIN — METOPROLOL TARTRATE 12.5 MG: 25 TABLET, FILM COATED ORAL at 10:30

## 2022-03-03 RX ADMIN — SODIUM CHLORIDE: 9 INJECTION, SOLUTION INTRAVENOUS at 03:34

## 2022-03-03 RX ADMIN — ACETAMINOPHEN 1000 MG: 500 TABLET ORAL at 18:47

## 2022-03-03 ASSESSMENT — PAIN SCALES - GENERAL
PAINLEVEL_OUTOF10: 4
PAINLEVEL_OUTOF10: 0
PAINLEVEL_OUTOF10: 9
PAINLEVEL_OUTOF10: 0
PAINLEVEL_OUTOF10: 0
PAINLEVEL_OUTOF10: 4
PAINLEVEL_OUTOF10: 9
PAINLEVEL_OUTOF10: 4
PAINLEVEL_OUTOF10: 0

## 2022-03-03 ASSESSMENT — PAIN DESCRIPTION - FREQUENCY
FREQUENCY: CONTINUOUS
FREQUENCY: INTERMITTENT
FREQUENCY: CONTINUOUS
FREQUENCY: CONTINUOUS

## 2022-03-03 ASSESSMENT — PAIN DESCRIPTION - ONSET
ONSET: ON-GOING

## 2022-03-03 ASSESSMENT — PAIN DESCRIPTION - ORIENTATION
ORIENTATION: RIGHT

## 2022-03-03 ASSESSMENT — PAIN DESCRIPTION - LOCATION
LOCATION: HIP

## 2022-03-03 ASSESSMENT — PAIN DESCRIPTION - PAIN TYPE
TYPE: SURGICAL PAIN

## 2022-03-03 ASSESSMENT — PAIN DESCRIPTION - PROGRESSION
CLINICAL_PROGRESSION: RAPIDLY WORSENING
CLINICAL_PROGRESSION: RAPIDLY WORSENING
CLINICAL_PROGRESSION: NOT CHANGED
CLINICAL_PROGRESSION: RAPIDLY WORSENING
CLINICAL_PROGRESSION: RAPIDLY WORSENING

## 2022-03-03 ASSESSMENT — PAIN DESCRIPTION - DESCRIPTORS: DESCRIPTORS: ACHING

## 2022-03-03 NOTE — PLAN OF CARE
Problem: Falls - Risk of:  Goal: Will remain free from falls  Description: Will remain free from falls  3/3/2022 0426 by Prakash Gould RN  Outcome: Ongoing  3/2/2022 1618 by Julia Angela RN  Outcome: Met This Shift  Goal: Absence of physical injury  Description: Absence of physical injury  3/3/2022 0426 by Prakash Gould RN  Outcome: Ongoing  3/2/2022 1618 by Julia Angela RN  Outcome: Met This Shift     Problem: Pain:  Description: Pain management should include both nonpharmacologic and pharmacologic interventions.   Goal: Pain level will decrease  Description: Pain level will decrease  3/3/2022 0426 by Prakash Gould RN  Outcome: Ongoing  3/2/2022 1618 by Julia Angela RN  Outcome: Met This Shift  Goal: Control of acute pain  Description: Control of acute pain  3/3/2022 0426 by Prakash Gould RN  Outcome: Ongoing  3/2/2022 1618 by Julia Angela RN  Outcome: Met This Shift  Goal: Control of chronic pain  Description: Control of chronic pain  3/3/2022 0426 by Prakash Gould RN  Outcome: Ongoing  3/2/2022 1618 by Julia Angela RN  Outcome: Met This Shift     Problem: Skin Integrity:  Goal: Will show no infection signs and symptoms  Description: Will show no infection signs and symptoms  3/3/2022 0426 by Prakash Gould RN  Outcome: Ongoing  3/2/2022 1618 by Julia Angela RN  Outcome: Met This Shift  Goal: Absence of new skin breakdown  Description: Absence of new skin breakdown  3/3/2022 0426 by Prakash Gould RN  Outcome: Ongoing  3/2/2022 1618 by Julia Angela RN  Outcome: Met This Shift  Goal: Demonstration of wound healing without infection will improve  Description: Demonstration of wound healing without infection will improve  3/3/2022 0426 by Prakash Gould RN  Outcome: Ongoing  3/2/2022 1618 by Julia Angela RN  Outcome: Met This Shift  Goal: Risk for impaired skin integrity will decrease  Description: Risk for impaired skin integrity will decrease  3/3/2022 0426 by Kelly Huffman RN  Outcome: Ongoing  3/2/2022 1618 by Luan Taylor RN  Outcome: Met This Shift     Problem:  Activity:  Goal: Ability to ambulate will improve  Description: Ability to ambulate will improve  3/3/2022 0426 by Kelyl Huffman RN  Outcome: Ongoing  3/2/2022 1618 by Luan Taylor RN  Outcome: Met This Shift  Goal: Ability to perform activities at highest level will improve  Description: Ability to perform activities at highest level will improve  3/3/2022 0426 by Kelly Huffman RN  Outcome: Ongoing  3/2/2022 1618 by Luan Taylor RN  Outcome: Met This Shift     Problem: Health Behavior:  Goal: Identification of resources available to assist in meeting health care needs will improve  Description: Identification of resources available to assist in meeting health care needs will improve  3/3/2022 0426 by Kelly Huffman RN  Outcome: Ongoing  3/2/2022 1618 by Luan Taylor RN  Outcome: Met This Shift     Problem: Nutritional:  Goal: Ability to attain and maintain optimal nutritional status will improve  Description: Ability to attain and maintain optimal nutritional status will improve  3/3/2022 0426 by Kelly Huffman RN  Outcome: Ongoing  3/2/2022 1618 by Luan Taylor RN  Outcome: Met This Shift

## 2022-03-03 NOTE — FLOWSHEET NOTE
PT AGAIN REMOVING MONITORS, GOWN , & BLANKETS. RAISES FIST TO NURSE. PT REASSRED & REORIENTED. WHEN ASKED, PT STATES CANT REMEMBER WIFES NAME.

## 2022-03-03 NOTE — CONSULTS
Physical Medicine & Rehabilitation  Consult Note      Admitting Physician:   Mayela Oropeza MD    Primary Care Provider:   Juanis Nieto MD     Reason for Consult:  Acute Inpatient Rehabilitation    Chief Complaint: R hip pain after fall    History of Present Illness:  Referring Provider is requesting an evaluation for appropriate placement upon discharge from acute care. History from chart review and patient/wife. Ashly Blevins is a 80 y.o. RHD male admitted to [address] [place] [address] on 3/1/2022. Patient admitted to ED after missing a step and falling at a restaurant with subsequent R leg weakness and R hip pain. Diagnostic studies confirmed R femoral neck fracture. He has history of L femoral neck fracture treated with hemiarthroplasty. Dr. Loly Zazueta performed R hip hemiarthroplasty on 3/2/22. Cardiology consulted for history of CAD with remote history of CABG. Continuing amlodipine, metoprolol and recommended IV hydration with ECG monitoring post-op. IM managing medical comorbidities including post-op delirium. WBC elevated today. Patient notes pain is responding to prn medications. Review of Systems:  Constitutional: negative for chills, fatigue, fevers, sweats and weight loss. Notes some poor appetite past few days  Eyes: negative for redness and visual disturbance  Ears, nose, mouth, throat, and face: negative for earaches, sore throat and tinnitus  Respiratory: negative for cough and shortness of breath  Cardiovascular: negative for chest pain, dyspnea and palpitations  Gastrointestinal: negative for abdominal pain, change in bowel habits, nausea and vomiting.  No BM for 3-4 days  Genitourinary:negative for dysuria, frequency, hesitancy and urinary incontinence  Integument/breast: negative for pruritus and rash  Musculoskeletal:positive for stiff joints  Neurological: negative for dizziness, headaches   Behavioral/Psych: negative for decreased appetite, depression        Premorbid function:  Independent    Current function:    PT:  Restrictions/Precautions: Weight Bearing,Fall Risk  Implants present? : Metal implants (left and right hip hemiarthroplasty, sternal wires from CABG)  Other position/activity restrictions: NO ACTIVE R HIP ABDUCTION  Right Lower Extremity Weight Bearing:  (RLE FWB)   Transfers  Sit to Stand: 2 Person Assistance,Maximum Assistance  Stand to sit: 2 Person Assistance,Moderate Assistance  Stand Pivot Transfers:  (DEPENDENT FLACA STEDY USED FOR TRANSFER TO CHAIR)  Comment: pt stood bedside with RW and Josseline x 2  for approx 1 min  WB Status: right FWB    Transfers  Sit to Stand: 2 Person [facility]  Stand to sit: 2 Person Assistance,Moderate Assistance  Stand Pivot Transfers:  (DEPENDENT FLACA STEDY USED FOR TRANSFER TO CHAIR)  Comment: pt stood bedside with RW and Josseline x 2  for approx 1 min  Ambulation  Ambulation?: No (attempted to take side-steps- able to slightly move R LE towards the left but unable to step with the L LE)  WB Status: right FWB                 OT:   ADL  Feeding: Setup  Grooming: Setup  UE Bathing: Contact guard assistance  LE Bathing: Dependent/Total  UE Dressing: Contact guard assistance  LE Dressing: Dependent/Total  Toileting: Dependent/Total  Additional Comments: ADL scores based on skilled observation and clinical reasoning, unless otherwise noted.  Assitance required due to cognitive impairments, significant pain, limited mobility, impacting safety and independence with self care         Balance  Sitting Balance: Contact guard assistance (sat EOB 3-4 minutes, CGA - max A )  Standing Balance: Dependent/Total (max A x2 in flaca stedy )   Standing Balance  Time: ~1-2 minutes  Activity: functional transfers  Comment: with RW for UE support; initially maxA to maintain static standing -> CGA with time  Functional Mobility  Functional - Mobility Device: Other Jenae Kidney sterod)  Activity: Other (bed<>recliner)  Assist Level: Dependent/Total (Max A x2)  Functional Mobility Comments: Attempted to sequence side step; however, pt unable to complete due to pain and lethargy     Bed mobility  Rolling to Left: Maximum assistance (X 3)  Rolling to Right: Maximum assistance,2 Person assistance  Supine to Sit: Maximum assistance,2 Person assistance  Sit to Supine: Maximum assistance,2 Person assistance  Scooting: Maximal assistance  Comment: Max cues for sequencing log roll, no demonstration of understanding. Sat EOB ~3-4 minutes with max A to maintain as pt was actively resisting. Pt back in bed with ice along incision and cuffs in place. Transfers  Stand Pivot Transfers: Dependent/Total (Max A x2 in Lay strong)  Sit to stand: Maximum assistance,2 Person assistance  Stand to sit: Moderate assistance,2 Person assistance  Transfer Comments: Max tactile/verbal cueing for hand/pfoot placement.                   SLP:      Past Medical History:        Diagnosis Date    BPH (benign prostatic hyperplasia)     Coronary artery disease involving native coronary artery     Hypertension        Past Surgical History:        Procedure Laterality Date    CORONARY ARTERY BYPASS GRAFT      2005    HIP ARTHROPLASTY Left     June 2020    HIP SURGERY Right 03/02/2022    HIP HEMIARTHROPLASTY performed by Marj Iverson MD at [address] [place] [address]        Allergies:    No Known Allergies     Current Medications:   Current Facility-Administered Medications: tamsulosin (FLOMAX) capsule 0.8 mg, 0.8 mg, Oral, Daily  0.9 % sodium chloride infusion, , IntraVENous, Continuous  fentaNYL (SUBLIMAZE) injection 25 mcg, 25 mcg, IntraVENous, Once  enoxaparin (LOVENOX) injection 40 mg, 40 mg, SubCUTAneous, Daily  ceFAZolin (ANCEF) 2000 mg in dextrose 5 % 50 mL IVPB, 2,000 mg, IntraVENous, Q8H  sodium chloride flush 0.9 % injection 5-40 mL, 5-40 mL, IntraVENous, 2 times per day  sodium chloride flush 0.9 % injection 5-40 mL, 5-40 mL, IntraVENous, PRN  0.9 % sodium chloride infusion, 25 mL, IntraVENous, PRN  acetaminophen (TYLENOL) tablet 650 mg, 650 mg, Oral, Q4H PRN  ondansetron (ZOFRAN-ODT) disintegrating tablet 4 mg, 4 mg, Oral, Q8H PRN **OR** ondansetron (ZOFRAN) injection 4 mg, 4 mg, IntraVENous, Q6H PRN  morphine (PF) injection 2 mg, 2 mg, IntraVENous, Q2H PRN  acetaminophen (TYLENOL) tablet 1,000 mg, 1,000 mg, Oral, Q6H  oxyCODONE (ROXICODONE) immediate release tablet 5 mg, 5 mg, Oral, Q4H PRN  amLODIPine (NORVASC) tablet 2.5 mg, 2.5 mg, Oral, Daily  atorvastatin (LIPITOR) tablet 80 mg, 80 mg, Oral, Nightly  finasteride (PROSCAR) tablet 5 mg, 5 mg, Oral, Daily  metoprolol tartrate (LOPRESSOR) tablet 12.5 mg, 12.5 mg, Oral, BID    Social History:  Lives With: Spouse  Type of Home: House  Home Layout: Bed/Bath upstairs (bi-level home)  Home Access: Stairs to enter without rails  Entrance Stairs - Number of Steps: 1 small step through garage to lower level of home; 7 steps  w/ right rail either direction in home ( 3 bedrooms and full bath upper level;  1/2 bath on lower level)  Entrance Stairs - Rails: None  Bathroom Shower/Tub: Doors,Walk-in shower,Shower chair without back  Bathroom Toilet: Standard (tolet raiser w/ rails on upper floor bathroom)  Bathroom Equipment: Grab bars in shower,Shower chair,Grab bars around toilet  Bathroom Accessibility: Walker accessible  Home Equipment: Reacher,Sock aid,Rolling walker,Quad cane  Receives Help From: Family  ADL Assistance: Independent  Homemaking Assistance: Needs assistance (spouse is primary)  Homemaking Responsibilities: No (spouse is primary)  Ambulation Assistance: Independent (no device)  Transfer Assistance: Independent  Active : Yes  Mode of Transportation: SUV  Occupation: Retired  IADL Comments: sleeps in a flat bed  Additional Comments: spouse is home w/ pt all the time, carmen corea lives nearby and able to assist, dtr lives in [place]  Social History     Socioeconomic History    Marital status:      Spouse name: None    Number of children: None  Years of education: None    Highest education level: None   Occupational History    None   Tobacco Use    Smoking status: None    Smokeless tobacco: None   Substance and Sexual Activity    Alcohol use: None    Drug use: None    Sexual activity: None   Other Topics Concern    None   Social History Narrative    None     Social Determinants of Health     Financial Resource Strain:     Difficulty of Paying Living Expenses: Not on file   Food Insecurity:     Worried About Running Out of Food in the Last Year: Not on file    Laura of Food in the Last Year: Not on file   Transportation Needs:     Lack of Transportation (Medical): Not on file    Lack of Transportation (Non-Medical): Not on file   Physical Activity:     Days of Exercise per Week: Not on file    Minutes of Exercise per Session: Not on file   Stress:     Feeling of Stress : Not on file   Social Connections:     Frequency of Communication with Friends and Family: Not on file    Frequency of Social Gatherings with Friends and Family: Not on file    Attends Holiness Services: Not on file    Active Member of [address] [place] [address] or Organizations: Not on file    Attends Club or Organization Meetings: Not on file    Marital Status: Not on file   Intimate Partner Violence:     Fear of Current or Ex-Partner: Not on file    Emotionally Abused: Not on file    Physically Abused: Not on file    Sexually Abused: Not on file   Housing Stability:     Unable to Pay for Housing in the Last Year: Not on file    Number of [place] in the Last Year: Not on file    Unstable Housing in the Last Year: Not on file       Family History:   History reviewed. No pertinent family history.     Diagnostics:    CBC:   Recent Labs     03/01/22  1515 03/03/22  0502   WBC 10.7 12.2*   RBC 3.97* 3.73*   HGB 12.6* 11.8*   HCT 37.8* 35.5*   MCV 95.3 95.2   RDW 13.2 13.0    201     BMP:    Recent Labs     03/01/22  1515 03/03/22  0502   GLUCOSE 133* 162*   BUN 18 21 CREATININE 1.23* 1.07   CALCIUM 8.5* 8.1*    138   K 4.7 4.3    106   CO2 25 22   ANIONGAP 10 10   LABGLOM 55* >60   GFRAA >60 >60   GFR               HbA1c: No results found for: LABA1C  BNP: No results for input(s): BNP in the last 72 hours. PT/INR: No results for input(s): PROTIME, INR in the last 72 hours. APTT: No results for input(s): APTT in the last 72 hours. CARDIAC ENZYMES: No results for input(s): CKMB, CKMBINDEX, TROPONINT, TROPHS, TROPII in the last 72 hours. Invalid input(s): CKTOTAL;3   FASTING LIPID PANEL:No results found for: CHOL, HDL, TRIG  LIVER PROFILE: No results for input(s): AST, ALT, ALB, BILIDIR, BILITOT, ALKPHOS in the last 72 hours. Radiology:    XR CHEST PORTABLE     Result Date: 3/1/2022  No acute airspace disease identified.      XR HIP 2-3 VW W PELVIS RIGHT     Result Date: 3/1/2022  Mildly displaced subcapital right femoral neck fracture. Physical Exam:  BP (!) 130/54   Pulse 84   Temp 98.9 °F (37.2 °C) (Oral)   Resp 16   Ht 5' 11\" (1.803 m)   Wt 205 lb (93 kg)   SpO2 93%   BMI 28.59 kg/m²     GEN: Well developed, well nourished, no acute distress. HEENT: NCAT, PERRL, EOMI, mucous membranes pink and moist.  RESP: Lungs clear to auscultation. No rales or rhonchi. Respirations WNL and unlabored. CV: Regular rate rhythm. No murmurs, rubs, or gallops. ABD: soft, non-distended, non-tender. BS+ and equal.  NEURO: A&O x 3. Sensation intact to light touch. DTRs 2+  MSK: Functional ROM BUE and LLEs. Weakness and pain impairs AROM RLE. Muscle tone and bulk are normal bilaterally. Strength 4+/5 key muscles BUEs. Key muscles LLE 4/5. R hip flexion 1/5. R knee extension 2/5. B plantar and dorsiflexion 4/5. EXT: No calf tenderness to palpation. Proximal RLE post op edema   SKIN: Warm dry and intact with good turgor. Lateral right hip incision with dressing CD&I.   PSYCH: Mood WNL. Affect WNL. Appropriately interactive.     Impression:  1. R hip fracture: s/p hemiarthroplasty by Dr. Cheyanne Tamayo 3/2/22. WBAT. Has oxycodone and tylenol, morphine and fentanyl prn pain  2. HTN/CAD: remote history CABG. On amlodipine, atorvastatin, Lopressor. 3. Post op delirium: avoiding sedation. IM following  4. Leukocytosis: on cefazolin until 3/4.   5. BPH: on Flomax, finasteride    Recommendations:    1. Diagnosis:  R hip fracture  2. Therapy: Has PT/OT needs  3. Medical Necessity: As above  4. Support: Lives with supportive spouse who is available 24/7. She reports he lived on 1st floor after last hip fracture. Son lives locally in [facility] and daughter lives in [facility]. 5. Rehab Recommendation: The patient will benefit from acute inpatient rehabilitation once medically stable per primary and consulting services. Anticipate she will be able to tolerate 3 hours of therapy per day or 900 minutes per week in rehabilitation. The patient requires multidisciplinary rehabilitation treatment including medical management by a PM&R physician, 24 hour rehabilitation nursing, Physical/Occupational therapy,  rehabilitation social work, and nutrition services. Patient and family also require education in post-hospital precautions and home exercise routine, adaptive techniques and deficit compensation strategies, strengthening and conditioning, equipment prescription and instructions in use. 6. He will benefit from post op pain management and blood pressure management. He has post op leukocytosis to manage and post op delirium which seems somewhat improved today. 7. DVT Prophylaxis: on Lovenox    It was my pleasure to evaluate Padmini Bautista today. Please call with questions. Esther Humphries MD        This note is created with the assistance of a speech recognition program.  While intending to generate a document that actually reflects the content of the visit, the document can still have some errors including those of syntax and sound a like substitutions which may escape proof reading.   In

## 2022-03-03 NOTE — PROGRESS NOTES
[address]   INPATIENT OCCUPATIONAL THERAPY  PROGRESS NOTE  Date: 3/3/2022  Patient Name: Charu Castle      Room: 8733/1329-55  MRN: [id number]    : 1934  (80 y.o.) Gender: male     Discharge Recommendations:  Further Occupational Therapy is recommended upon facility discharge. Referring Practitioner: Jose Juan Yoon MD  Diagnosis: R hip hemiarthroplasty   General  Chart Reviewed: Radha Lenz and Physical  Patient assessed for rehabilitation services?: Yes  Family / Caregiver Present: Yes (Wife)  Referring Practitioner: Jose Juan Yoon MD  Diagnosis: R hip hemiarthroplasty     Restrictions  Restrictions/Precautions: Weight Bearing,Fall Risk  Implants present? : Metal implants (left and right hip hemiarthroplasty, sternal wires from CABG)  Other position/activity restrictions: NO ACTIVE R HIP ABDUCTION  Right Lower Extremity Weight Bearing:  (RLE FWB)  Required Braces or Orthoses?: No      Subjective  Subjective: \"I'll pray for you girls\" Pt required cueing to maintain attention to task  Patient Currently in Pain: Denies             Objective  Cognition  Overall Cognitive Status: Impaired  Arousal/Alertness: Inconsistent responses to stimuli  Following Directions: Follows one step commands  Following Commands: Follows one step commands with repetition  Awareness of Errors: Decreased awareness of errors  Sequencing and Organization: Assistance required to identify errors made;Assistance required to generate solutions;Assistance required to implement solutions  Additional Comments: Pt appears confused and drowsy  Bed mobility  Rolling to Right: Maximum assistance;2 Person assistance  Supine to Sit: Maximum assistance;2 Person assistance  Sit to Supine: Maximum assistance;2 Person assistance  Scooting: Maximal assistance  Comment: Max cues for sequencing log roll, no demonstration of understanding.  Sat EOB ~3-4 minutes with max A to maintain as pt was actively resisting. Pt back in bed with ice along incision and cuffs in place. Balance  Sitting Balance: Contact guard assistance (sat EOB 3-4 minutes, CGA - max A )  Standing Balance  Time: ~1-2 minutes  Activity: functional transfers  Comment: with RW for UE support; initially maxA to maintain static standing -> CGA with time  Functional Mobility  Functional Mobility Comments: Attempted to sequence side step; however, pt unable to complete due to pain and lethargy   ADL  Feeding: Setup  Grooming: Setup  UE Bathing: Contact guard assistance  LE Bathing: Dependent/Total  UE Dressing: Contact guard assistance  LE Dressing: Dependent/Total  Toileting: Dependent/Total  Additional Comments: ADL scores based on skilled observation and clinical reasoning, unless otherwise noted. Assitance required due to cognitive impairments, significant pain, limited mobility, impacting safety and independence with self care     Transfers  Sit to stand: Maximum assistance;2 Person assistance  Stand to sit: Moderate assistance;2 Person assistance  Transfer Comments: Max tactile/verbal cueing for hand/pfoot placement. Assessment  Performance deficits / Impairments: Decreased functional mobility ; Decreased ADL status; Decreased ROM; Decreased strength;Decreased safe awareness;Decreased cognition;Decreased endurance;Decreased balance;Decreased high-level IADLs;Decreased fine motor control;Decreased coordination  Prognosis: Good  Discharge Recommendations: Patient would benefit from continued therapy after discharge  Activity Tolerance: Treatment limited secondary to decreased cognition;Patient limited by fatigue  Safety Devices in place: Yes  Type of devices: All fall risk precautions in place; Bed alarm in place;Call light within reach;Gait belt;Patient at risk for falls; Left in bed;Nurse notified             Patient Education: ed pt on OT POC, log roll technique, safe transfers, safe use of RW Learner:patient and significant other  Method: explanation       Outcome: needs reinforcement     Plan  Safety Devices  Safety Devices in place: Yes  Type of devices:  All fall risk precautions in place,Bed alarm in place,Call light within reach,Gait belt,Patient at risk for falls,Left in bed,Nurse notified  Plan  Times per week: 5-7 (BID)  Times per day: Twice a day  Current Treatment Recommendations: Strengthening,Balance Training,ROM,Functional Mobility Training,Endurance Training,Cognitive Reorientation,Pain Management,Patient/Caregiver Education & Training,Safety Education & Training,Equipment Evaluation, Education, & procurement,Positioning,Self-Care / ADL      Goals  Short term goals  Time Frame for Short term goals: by discharge  Short term goal 1: Pt will complete functional mobility/transfers during functional activity Max A with RW and Good safety for increased participation in self care  Short term goal 2: Pt will tolerate 5+ minutes of standing Max A with RW and Good safety during functional activity to increase participation in self care  Short term goal 3: Pt will complete LB bathing/dressing/toileting Max A with AE/DME/adaptive techniques and Good safety for increased IND with self care  Short term goal 4: Pt will participate in 15-20 minutes of functional activity for increased strength and activity tolerance for self care  Short term goal 5: Pt will verbalize/demonstrate Good understanding of fall prevention strategies for increased safety in ADL's    OT Individual Minutes  Time In: 1888  Time Out: [address]  Minutes: 23      Electronically signed by Luis Zapata OT on 3/3/22 at 4:01 PM EST        03/03/22 1600   OT Individual Minutes   Time In 2664   Time Out 1534   Minutes 23   Time Code Minutes    Timed Code Treatment Minutes 23 Minutes
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
## 2022-03-03 NOTE — ANESTHESIA POSTPROCEDURE EVALUATION
POST- ANESTHESIA EVALUATION       Pt Name: Nan Cole  MRN: [id number]  YOB: 1934  Date of evaluation: 3/2/2022  Time:  8:26 PM      BP (!) 161/72   Pulse 83   Temp 98.6 °F (37 °C) (Temporal)   Resp 13   Ht 5' 11\" (1.803 m)   Wt 160 lb (72.6 kg)   SpO2 96%   BMI 22.32 kg/m²      Consciousness Level  Awake  Cardiopulmonary Status  Stable  Pain Adequately Treated YES  Nausea / Vomiting  NO  Adequate Hydration  YES  Anesthesia Related Complications NONE      Electronically signed by Dannie Baker MD on 3/2/2022 at 8:26 PM       Department of Anesthesiology  Postprocedure Note    Patient: Nan Cole  MRN: [id number]  YOB: 1934  Date of evaluation: 3/2/2022  Time:  8:26 PM     Procedure Summary     Date: 03/02/22 Room / Location: [address] [place] [address] / [place] [id number]    Anesthesia Start: 4165 Anesthesia Stop: 2012    Procedure: HIP HEMIARTHROPLASTY (Right Hip) Diagnosis:       (RIGHT HIP FRACTURE)      (COVID (-))    Surgeons: Marj Iverson MD Responsible Provider: Dannie Baker MD    Anesthesia Type: general ASA Status: 3 - Emergent          Anesthesia Type: general    Sarmad Phase I:      Sarmad Phase II:      Last vitals: Reviewed and per EMR flowsheets.        Anesthesia Post Evaluation

## 2022-03-03 NOTE — PROGRESS NOTES
[facility]    Occupational Therapy Evaluation  Date: 3/3/22  Patient Name: Radha Chun       Room: 2238/6887-03  MRN: [id number]  Account: [de-identified]   : 1934  (80 y.o.) Gender: male     Discharge Recommendations: The patient would benefit from an intensive level of therapy after discharge from the facility. They should be able to tolerate 3-hours of Combined OT/PT/ST over 5 days/week or at least 900 minutes of  Combined Therapy over 7 days. Referring Practitioner: Edwige Velázquez MD  Diagnosis: R hip hemiarthroplasty        Treatment Diagnosis: impaired self care status  Past Medical History:  has a past medical history of BPH (benign prostatic hyperplasia), Coronary artery disease involving native coronary artery, and Hypertension. Past Surgical History:   has a past surgical history that includes hip surgery (Right, 2022); Hip Arthroplasty (Left); and Coronary artery bypass graft. Restrictions  Restrictions/Precautions: Weight Bearing,Fall Risk (peripheral IV right antecubital and right hand, right FWB)  Implants present? : Metal implants (left and right hip hemiarthroplasty, sternal wires from CABG)  Other position/activity restrictions: up w/ assist  Right Lower Extremity Weight Bearing:  (RLE FWB)  Required Braces or Orthoses?: No     Vitals  Temp: 99.7 °F (37.6 °C)  Pulse: 93  Resp: 16  BP: (!) 116/51  Height: 5' 11\" (180.3 cm)  Weight: 205 lb (93 kg)  BMI (Calculated): 28.7  Oxygen Therapy  SpO2: 94 %  Pulse Oximeter Device Mode: Intermittent  Pulse Oximeter Device Location: Finger  O2 Device: None (Room air)  O2 Flow Rate (L/min): 3 L/min  Level of Consciousness: Alert (0)    Subjective  Subjective:  \"The pain is shooting down my leg when we move\" Pt reports regarding pain level in R hip   Comments: Okay for PT/OT eval per RN  Overall Orientation Status: Impaired  Orientation Level: Oriented to place,Oriented to situation,Oriented to person,Disoriented to time (unable to recall current year)  Vision  Vision: Impaired  Vision Exceptions: Wears glasses at all times  Hearing  Hearing: Exceptions to [facility]  Hearing Exceptions: Hard of hearing/hearing concerns,No hearing aid  Social/Functional History  Lives With: Spouse  Type of Home: House  Home Layout: Bed/Bath upstairs (bi-level home)  Home Access: Stairs to enter without rails  Entrance Stairs - Number of Steps: 1 small step through garage to lower level of home; 7 steps  w/ right rail either direction in home ( 3 bedrooms and full bath upper level;  1/2 bath on lower level)  Entrance Stairs - Rails: None  Bathroom Shower/Tub: Doors,Walk-in shower,Shower chair without back  Bathroom Toilet: Standard (tolet raiser w/ rails on upper floor bathroom)  Bathroom Equipment: Grab bars in shower,Shower chair,Grab bars around toilet  Bathroom Accessibility: Walker accessible  Home Equipment: Reacher,Sock aid,Rolling walker,Quad cane  Receives Help From: Family  ADL Assistance: Independent  Homemaking Assistance: Needs assistance (spouse is primary)  Homemaking Responsibilities: No (spouse is primary)  Ambulation Assistance: Independent (no device)  Transfer Assistance: Independent  Active : Yes  Mode of Transportation: SUV  Occupation: Retired  IADL Comments: sleeps in a flat bed  Additional Comments: spouse is home w/ pt all the time, son Aracelis Escalante lives nearby and able to assist, dtr lives in [place]  Pain Assessment  Pain Assessment: 0-10  Pain Level: 9  Patient's Stated Pain Goal: No pain  Pain Type: Surgical pain  Pain Location: Hip  Pain Orientation: Right  Pain Frequency: Continuous  Clinical Progression: Rapidly worsening  Response to Pain Intervention: Confused    Objective      Cognition  Overall Cognitive Status: Impaired  Arousal/Alertness: Inconsistent responses to stimuli  Following Directions: Follows one step commands  Memory: Decreased recall of biographical information  Following Commands:  Follows one step commands with repetition  Awareness of Errors: Decreased awareness of errors  Sequencing and Organization: Assistance required to identify errors made,Assistance required to generate solutions,Assistance required to implement solutions  Additional Comments: Pt appears confused at times, requiring cuing to follow through with simple comands. Unable to recal the current year. Perception  Overall Perceptual Status: Impaired  Initiation: Cues to initiate tasks  Motor Planning: Cues to use objects appropriately  Sensation  Overall Sensation Status: WFL (denies)   ADL  Feeding: Setup  Grooming: Setup  UE Bathing: Contact guard assistance  LE Bathing: Dependent/Total  UE Dressing: Contact guard assistance  LE Dressing: Dependent/Total  Toileting: Dependent/Total  Additional Comments: ADL scores obtained through S/OT clinical reasoning/skilled observation. Pt Max A x3 for to roll in bed and sup<> sit with VC's for hand placement and initiation. Pt CGA to sit EOB. Pt reports dizziness with sitting EOB which gradually subsided with rest. Pt vitals taken- 126/62, 97% SpO2, . Pt dependent with 2 person assist sit<>stand with RW, however was unable to stand. Rossi strong utilized with Max A x2 sit<>stand with VC's for hand placement and initiation. Pt Max A in [address] [facility] stedy x2 stand<> sit in recliner. Pt unable to don/doff socks or breif this date. Pt reports high level of pain with movement.     UE Function           LUE Strength  Gross LUE Strength: Exceptions to [facility]  L Hand General: 3+/5     LUE Tone: Normotonic     LUE AROM (degrees)  LUE AROM : WFL     Left Hand AROM (degrees)  Left Hand AROM: WFL  RUE Strength  Gross RUE Strength: Exceptions to [facility]  R Hand General: 3+/5      RUE Tone: Normotonic     RUE AROM (degrees)  RUE AROM : WFL     Right Hand AROM (degrees)  Right Hand AROM: WFL    Fine Motor Skills  Coordination  Movements Are Fluid And Coordinated: Yes          LUE Edema - Circumference (cm)  LUE Edema Present?: No     RUE Edema - Circumference (cm)  RUE Edema Present?: No          Mobility  Supine to Sit: Maximum assistance (x3)       Balance  Sitting Balance: Contact guard assistance (EOB; HOB flattened; VC's for use of bedrail)  Standing Balance: Dependent/Total (max A x2 in flaca strong )  Standing Balance  Time: ~1-2 minutes  Activity: transfer bed<>recliner  Comment: Max A x2 in flaca strong   Functional Mobility  Functional - Mobility Device: Other Aggie Watson tae)  Activity: Other (bed<>recliner)  Assist Level: Dependent/Total (Max A x2)  Functional Mobility Comments: in Jessica strong; attempted sit<>stand Max A x2 with RW, pt unable to stand  Bed mobility  Rolling to Left: Maximum assistance (X 3)  Rolling to Right: Maximum assistance (x3)  Supine to Sit: Maximum assistance (x3)  Scooting: Maximal assistance (x3)  Comment: attempted rolling in bed to place brief but pt has difficulty tolerating semi rolling to the right due to pain level     Transfers  Stand Pivot Transfers: Dependent/Total (Max A x2 in Jessica strong)  Sit to stand: Dependent/Total (Max A x2 in flaca strong)  Stand to sit: Dependent/Total (max A x2 with flaca strong)  Transfer Comments: flaca strong for transfers; attempted to stand with RW max A x2 however unable to stand from EOB   Functional Activity Tolerance  Functional Activity Tolerance:  Tolerates 30 min exercise with multiple rests     Assessment  Assessment  Performance deficits / Impairments: Decreased functional mobility ,Decreased ADL status,Decreased ROM,Decreased strength,Decreased safe awareness,Decreased cognition,Decreased endurance,Decreased balance,Decreased high-level IADLs,Decreased fine motor control,Decreased coordination  Treatment Diagnosis: impaired self care status  Prognosis: Good  Decision Making: Medium Complexity  REQUIRES OT FOLLOW UP: Yes  Discharge Recommendations: Patient would benefit from continued therapy after discharge  Activity Tolerance: Patient Tolerated treatment well,Patient limited by fatigue,Patient limited by pain,Treatment limited secondary to decreased cognition         Functional Outcome Measures  AM-PAC Daily Activity Inpatient   How much help for putting on and taking off regular lower body clothing?: Total  How much help for Bathing?: A Lot  How much help for Toileting?: Total  How much help for putting on and taking off regular upper body clothing?: A Little  How much help for taking care of personal grooming?: A Little  How much help for eating meals?: A Little  AM-[facility] Inpatient Daily Activity Raw Score: 13  AM-PAC Inpatient ADL T-Scale Score : 32.03  ADL Inpatient CMS 0-100% Score: 63.03  ADL Inpatient CMS G-Code Modifier : CL       Goals  Patient Goals   Patient goals : to get home  Short term goals  Time Frame for Short term goals: by discharge  Short term goal 1: Pt will complete functional mobility/transfers during functional activity Max A with RW and Good safety for increased participation in self care  Short term goal 2: Pt will tolerate 5+ minutes of standing Max A with RW and Good safety during functional activity to increase participation in self care  Short term goal 3: Pt will complete LB bathing/dressing/toileting Max A with AE/DME/adaptive techniques and Good safety for increased IND with self care  Short term goal 4: Pt will participate in 15-20 minutes of functional activity for increased strength and activity tolerance for self care  Short term goal 5: Pt will verbalize/demonstrate Good understanding of fall prevention strategies for increased safety in ADL's    Plan  Safety Devices  Safety Devices in place: Yes  Type of devices: Call light within reach,Chair alarm in place,Gait belt,Patient at risk for falls,Left in chair,Nurse notified (Wife present )  Restraints  Initially in place: No     Plan  Times per week: 5-7 (BID)  Times per day: Twice a day  Current Treatment Recommendations: Strengthening,Balance Training,ROM,Functional Mobility Training,Endurance Training,Cognitive Reorientation,Pain Management,Patient/Caregiver Education & Training,Safety Education & Training,Equipment Evaluation, Education, & procurement,Positioning,Self-Care / ADL          OT Individual Minutes  Time In: 8742  Time Out: 6675  Minutes: 54    Electronically signed by ELIOT Lin/OT on 3/3/22 at 12:57 PM EST

## 2022-03-03 NOTE — OP NOTE
OPERATIVE REPORT    Date of Surgery: 3/2/2022     Pre-operative Diagnosis: Right femoral neck fracture (S72.001A)    Post-operative Diagnosis: Right femoral neck fracture (S72.001A)    Procedure: Right hip hemiarthroplasty (74454)     Surgeon(s): Harshal Kumar MD    Assistant(s): Coni Aleman DO (PGY 5)    Anesthesia: General    Fluids: See anesthesia record    Urine output: See anesthesia record    Estimated blood loss: 150 mL    Findings: transcervical femoral neck fracture    Specimen: None    Tourniquet time: NA    Implants: Julieta [employer] size 9 cemented Echo stem with 49 mm head and -6 neck adapter. 11 mm cement restrictor. Surgical Indications:  Janeen Rincon is a 80 y.o. old male who presented with a right femoral neck fracture. Following a discussion with the patient regarding both non-operative and operative treatment options, they consented to proceed with right hip hemiarthroplasty. he came to this decision after demonstrating a good understanding of our discussion regarding details of the procedure, risks and benefits, expected outcome, and postoperative course. Operative technique: Following appropriate identification of the patient and his operative extremity, consent was reviewed with the patient and his operative extremity was signed. he was wheeled to the operating room where he finished a course of pre-operative antibiotic prophylaxis by way of 2 G IV ancef. The anesthesia service administered a general anesthetic and established/secured his airway using an endotracheal tube. All bony prominences were appropriately padded and the patient was secured to the operative table in a Lateral decubitus position with the operative side up. An axillary roll and a peroneal nerve pad were placed. The patient's operative extremity was prepped and draped in a standard sterile fashion and a time out was performed during which the correct patient, operative extremity, and procedure were verified.  A longitudinal incision centered on the patient's greater trochanter was made sharply through skin. Dissection was carried through subcutaneous tissue and the IT band was identified and split in line with the skin incision starting distally and carrying the split proximally to and through the gluteus jacob raphe. The greater trochanteric bursa was excised. A standard anterolateral approach to the hip was performed taking care to limit proximal incision through the gluteus medius. Once the arthrotomy was made, and the anterior 3rd of the vastus lateralis elevated, the hip was dislocated through the fracture in the femoral neck. Measured resection of the femoral neck was performed referencing the lesser trochanter, and the femoral head and neck excised. Debris was evacuated from the acetabulum. The femoral head was sized to be 49 mm. A trial 49 mm  head was placed in the acetabulum and demonstrated appropriate fit. Attention was then redirected to the proximal femur and a box osteotome and then canal finder were used to create a path and starting  hole for canal reaming and broaching. The proximal femur was then reamed and then broached sequentially up to a size 12 broach which demonstrated appropriate fit and fill. Care was taken during the broaching process to follow the patient's native femoral neck anteversion and lateralize each broach as much as possible. With a size 12 broach in place, a -3 neck adapter  and a size 49 mm femoral head were assembled on the broach and the hip reduced gently. I had a very difficult time getting the hip reduced, so the neck adapter was exchanged for a -6. The hip was reduced. The hip was then taken through a range of motion. The patient's leg length was appropriately restored and the hip was shown to be stable with a standard offset neck, 49 mm head, and -6 neck adapter. Consequently the hip was dislocated and all trial implants removed.  A size 11 cement restrictor was inserted at appropriate depth. The femoral canal was prepped by thoroughly irrigating/brushing and drying. Once cement was mixed on the back table it was inserted under pressure in retrograde fashion. The final size 12 femoral stem was inserted to appropriate depth and in appropriate rotation. It was held in place while the cement cured. Excess cement was removed. Once the cement had cured, the final femoral head/neck adapter as determined by previous trialing were assembled on the stem and the hip was then reduced and again taken through a range of motion. It demonstrated appropriate stability with no impingement. The hip was thoroughly irrigated once more, evacuating all debris. Layered closure was performed using #2 ethibond for capsule closure and repair of the gluteus medius and vastus lateralis to the greater trochanter. This repair was over sewn with a running #1 vicryl stitch. The IT band and Gluteus jacob were repaired similarly with a running #1-vicryl stitch and deep subcutaneous tissue repair with interrupted 0-vicryl stitches. Superficial subQ tissue was repaired with interrupted buried 2-0 vicryl and staples were used on skin. Sterile dressings were then applied using xeroform, 4x4 gauze, ABD pads and tape. A hip abductor pillow was placed between the patient's legs. he was then transferred to his bed and wheeled to recovery in stable condition.     Complications: None    Condition: Stable

## 2022-03-03 NOTE — BRIEF OP NOTE
Brief Postoperative Note      Patient: Nan Cole  YOB: 1934  MRN: [id number]    Date of Procedure: 3/2/2022    Pre-Op Diagnosis: RIGHT HIP FRACTURE  COVID (-)    Post-Op Diagnosis: Same       Procedure(s):  HIP HEMIARTHROPLASTY    Surgeon(s):  Marj Iverson MD    Assistant:  Resident: Andrei Galan DO    Anesthesia: General    Estimated Blood Loss (mL): 959    Complications: None    Specimens:   * No specimens in log *    Implants:  Implant Name Type Inv.  Item Serial No.  Lot No. LRB No. Used Action   CEMENT BNE 40GM W/ GENT HI VISC RADPQ FOR REV SURG - [id number]  CEMENT BNE 40GM W/ GENT HI VISC RADPQ FOR REV SURG  [employer]WD [id number] Right 1 Implanted   CEMENT BNE 40GM W/ GENT HI VISC RADPQ FOR REV SURG - [id number]  CEMENT BNE 40GM W/ GENT HI VISC RADPQ FOR REV SURG  [employer]WD [id number] Right 1 Implanted   STEM FEM DIA9MM STD OFFSET HIP CO CHROM ECHO FX - [id number]  STEM FEM DIA9MM STD OFFSET HIP CO CHROM ECHO FX  [employer]- [id number] Right 1 Implanted   INSERT FEM NK L-6MM TAPR HIP CO CHROM MOLYBDENUM ALLY ENDO - [id number]  INSERT FEM NK L-6MM TAPR HIP CO CHROM MOLYBDENUM ALLY ENDO  [employer]WD [id number] Right 1 Implanted   COMPONENT UPLR [id number] HIP CO CHROM MOLYBDENUM ALLY MOD - [id number]  COMPONENT UPLR [id number] HIP CO CHROM MOLYBDENUM ALLY MOD  [employer]- [id number] Right 1 Implanted         Drains: * No LDAs found *    Findings: See operative report    Electronically signed by Marj Iverson MD on 3/2/2022 at 8:12 PM

## 2022-03-03 NOTE — PROGRESS NOTES
Dr Patrick Haynes at bedside to examine patient. Still exhibiting some confusion. Patient and spouse are requesting a rehab facility near their home in [facility]. Aneta AGEE case manager notified.  Electronically signed by Steven Iqbal RN on 3/3/2022 at 10:12 AM

## 2022-03-03 NOTE — PROGRESS NOTES
Pain appropriately controlled at this time. Apparently he had some issues with confusion overnight. Blood pressure (!) 116/51, pulse 93, temperature 99.7 °F (37.6 °C), temperature source Oral, resp. rate 16, height 5' 11\" (1.803 m), weight 205 lb (93 kg), SpO2 94 %. He is alert and oriented x3 evaluation of patient's right hip and lower extremity demonstrates his dressing to be clean, dry, intact. He has 2+ pedal pulses distally with brisk capillary refill in his toes. He can actively dorsiflex and plantarflex his foot and toes. Impression and plan: Mr. Naresh Romero is an 80year-old 1 day status post a right hip hemiarthroplasty. - Pain control  - Mobilize with PT. WBAT RLE.  No active abduction for 6 weeks  - DVT ppx: Lovenox  - Dressing change tomorrow  - DC planning
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
## 2022-03-03 NOTE — FLOWSHEET NOTE
PT TAKING OFF MONITORS, REMOVING O2, TAKING OFF GOWN. NURSES ATTEMPTING TO RETURN ALL ABOVE TO PROPER POSITIONS & PT GRABBING NURSES HANDS & SQUEEZING TIGHTLY.

## 2022-03-03 NOTE — PLAN OF CARE
Problem: Falls - Risk of:  Goal: Will remain free from falls  Description: Will remain free from falls  3/3/2022 1617 by Melecio Smith RN  Outcome: Met This Shift     Problem: Falls - Risk of:  Goal: Absence of physical injury  Description: Absence of physical injury  3/3/2022 1617 by Melecio Smith RN  Outcome: Met This Shift     Problem: Pain:  Goal: Pain level will decrease  Description: Pain level will decrease  3/3/2022 1617 by Melecio Smith RN  Outcome: Met This Shift     Problem: Pain:  Goal: Control of acute pain  Description: Control of acute pain  3/3/2022 1617 by Melecio Smith RN  Outcome: Ongoing     Problem: Pain:  Goal: Control of chronic pain  Description: Control of chronic pain  3/3/2022 1617 by Melecio Smith RN  Outcome: Met This Shift     Problem: Skin Integrity:  Goal: Absence of new skin breakdown  Description: Absence of new skin breakdown  3/3/2022 1617 by Melecio Smith RN  Outcome: Met This Shift     Problem: Skin Integrity:  Goal: Risk for impaired skin integrity will decrease  Description: Risk for impaired skin integrity will decrease  3/3/2022 1617 by Melecio Smith RN  Outcome: Ongoing     Problem:  Activity:  Goal: Ability to ambulate will improve  Description: Ability to ambulate will improve  3/3/2022 1617 by Melecio Smith RN  Outcome: Ongoing

## 2022-03-03 NOTE — PROGRESS NOTES
Physical Therapy  Facility/Department: [facility]  Daily Treatment Note  NAME: Venkatesh Finley  : 1934  MRN: [id number]    Date of Service: 3/3/2022    Discharge Recommendations:  Patient would benefit from continued therapy after discharge   PT Equipment Recommendations  Equipment Needed:  (TBD, spouse would like new wheeled walker)    Assessment   Body structures, Functions, Activity limitations: Decreased functional mobility ; Decreased safe awareness;Decreased balance;Decreased cognition;Decreased ROM; Decreased endurance;Decreased strength; Increased pain  Assessment: pt will need continued therapy at D/C. Pt currently requiring significant assist max x 2 persons for bed mobility and transfers. Treatment Diagnosis: impaired S/P right hip hemiarthroplasty  Specific instructions for Next Treatment: continue w/ BRANDYN STEDY and advance to wheeled walker when ready, instruct in ROM and strengthening exercises  Prognosis: Good  History: admitted following a fall at the [facility]  Exam: ROM, MMT, balance and mobility assessments  Barriers to Learning: cognition  REQUIRES PT FOLLOW UP: Yes  Activity Tolerance  Activity Tolerance: Patient limited by pain; Patient limited by endurance; Patient limited by cognitive status     Patient Diagnosis(es): The encounter diagnosis was Closed fracture of right hip, initial encounter ([facility] [address] has a past medical history of BPH (benign prostatic hyperplasia), Coronary artery disease involving native coronary artery, and Hypertension. has a past surgical history that includes hip surgery (Right, 2022); Hip Arthroplasty (Left); and Coronary artery bypass graft.     Restrictions  Restrictions/Precautions  Restrictions/Precautions: Weight Bearing,Fall Risk (peripheral IV right antecubital and right hand, right FWB)  Required Braces or Orthoses?: No  Implants present? : Metal implants (left and right hip hemiarthroplasty, sternal wires from CABG)  Lower Extremity Weight Bearing Restrictions  Right Lower Extremity Weight Bearing:  (RLE FWB)  Position Activity Restriction  Other position/activity restrictions: NO ACTIVE R HIP ABDUCTION  Subjective   General  Family / Caregiver Present: Yes  Subjective  Subjective: pt groggy and slightly confused  Pain Screening  Patient Currently in Pain: Denies  Vital Signs  Patient Currently in Pain: Denies        Objective   Bed mobility  Supine to Sit: 2 Person assistance;Maximum assistance  Sit to Supine: 2 Person assistance;Maximum assistance  Scooting: Maximal assistance  Transfers  Sit to Stand: 2 Person Assistance;Maximum Assistance  Stand to sit: 2 Person Assistance; Moderate Assistance  Comment: pt stood bedside with RW and Josseline x 2  for approx 1 min  Ambulation  Ambulation?: No (attempted to take side-steps- able to slightly move R LE towards the left but unable to step with the L LE)       Goals  Short term goals  Time Frame for Short term goals: BID x 5 days  Short term goal 1: pt to tolerate 1/2 hour of therapuetic exercise and activity for ROM and strengthening exercises  Short term goal 2: pt to show increased strength to right hip by 1/2 MMG to assist standing and transfers  Short term goal 3: pt to demonstrate rolling in bed and supine <> sit transfers using log rolling method w/ min x 2  Short term goal 4: pt to demonstrate pull to stand on the Molly Stedy w/ min x 2 and tolerate standing balance 4 minutes w/ min x 2 while maintaining right FWB  Short term goal 5: pt to advance to sit <> stand and bed <> chair transfers right LE FWB using wheeled walker w/ min x 2 after showing good balance and knee control on the [facility]  Short term goal 6: pt to advance ambulating 5-10' right LE FWB using wheeled walker w/ min x 2 after showing good balance and knee control on the [facility]  Patient Goals   Patient goals : return home    Plan    Plan  Times per week: BID x 5 days  Times per day:  (BID x 5 days)  Specific instructions for Next Treatment: continue w/ BRANDYN STEDY and advance to wheeled walker when ready, instruct in ROM and strengthening exercises  Current Treatment Recommendations: Adonna Schaumann Training,Patient/Caregiver Education & Training,Equipment Evaluation, Education, & procurement,ROM,Balance Training,Gait Training,Safety Education & Training,Functional Mobility Training  Safety Devices  Type of devices: Left in bed,Patient at risk for falls,Call light within reach,Bed alarm in place     Therapy Time   Individual Concurrent Group Co-treatment   Time In 1511         Time Out 1534         Minutes 23         Timed Code Treatment Minutes: 181 Elsy Dudley, PT

## 2022-03-04 LAB
ANION GAP SERPL CALCULATED.3IONS-SCNC: 9 MMOL/L (ref 9–17)
BUN BLDV-MCNC: 21 MG/DL (ref 8–23)
CALCIUM SERPL-MCNC: 7.8 MG/DL (ref 8.6–10.4)
CHLORIDE BLD-SCNC: 107 MMOL/L (ref 98–107)
CO2: 22 MMOL/L (ref 20–31)
CREAT SERPL-MCNC: 1.05 MG/DL (ref 0.7–1.2)
GFR AFRICAN AMERICAN: >60 ML/MIN
GFR NON-AFRICAN AMERICAN: >60 ML/MIN
GFR SERPL CREATININE-BSD FRML MDRD: ABNORMAL ML/MIN/{1.73_M2}
GLUCOSE BLD-MCNC: 117 MG/DL (ref 70–99)
HCT VFR BLD CALC: 32.3 % (ref 41–53)
HEMOGLOBIN: 10.7 G/DL (ref 13.5–17.5)
MCH RBC QN AUTO: 31.6 PG (ref 26–34)
MCHC RBC AUTO-ENTMCNC: 33.1 G/DL (ref 31–37)
MCV RBC AUTO: 95.7 FL (ref 80–100)
PDW BLD-RTO: 13.1 % (ref 11.5–14.9)
PLATELET # BLD: 183 K/UL (ref 150–450)
PMV BLD AUTO: 7.7 FL (ref 6–12)
POTASSIUM SERPL-SCNC: 3.8 MMOL/L (ref 3.7–5.3)
RBC # BLD: 3.38 M/UL (ref 4.5–5.9)
SODIUM BLD-SCNC: 138 MMOL/L (ref 135–144)
WBC # BLD: 15.2 K/UL (ref 3.5–11)

## 2022-03-04 PROCEDURE — 97535 SELF CARE MNGMENT TRAINING: CPT

## 2022-03-04 PROCEDURE — 97530 THERAPEUTIC ACTIVITIES: CPT

## 2022-03-04 PROCEDURE — 2580000003 HC RX 258: Performed by: ORTHOPAEDIC SURGERY

## 2022-03-04 PROCEDURE — 2060000000 HC ICU INTERMEDIATE R&B

## 2022-03-04 PROCEDURE — 97110 THERAPEUTIC EXERCISES: CPT

## 2022-03-04 PROCEDURE — 6370000000 HC RX 637 (ALT 250 FOR IP): Performed by: ORTHOPAEDIC SURGERY

## 2022-03-04 PROCEDURE — 99232 SBSQ HOSP IP/OBS MODERATE 35: CPT | Performed by: INTERNAL MEDICINE

## 2022-03-04 PROCEDURE — 6360000002 HC RX W HCPCS: Performed by: ORTHOPAEDIC SURGERY

## 2022-03-04 PROCEDURE — 85027 COMPLETE CBC AUTOMATED: CPT

## 2022-03-04 PROCEDURE — 80048 BASIC METABOLIC PNL TOTAL CA: CPT

## 2022-03-04 PROCEDURE — 36415 COLL VENOUS BLD VENIPUNCTURE: CPT

## 2022-03-04 RX ORDER — HYDROCODONE BITARTRATE AND ACETAMINOPHEN 5; 325 MG/1; MG/1
1 TABLET ORAL EVERY 4 HOURS PRN
Status: DISCONTINUED | OUTPATIENT
Start: 2022-03-04 | End: 2022-03-08 | Stop reason: HOSPADM

## 2022-03-04 RX ADMIN — FINASTERIDE 5 MG: 5 TABLET, FILM COATED ORAL at 08:59

## 2022-03-04 RX ADMIN — SODIUM CHLORIDE: 9 INJECTION, SOLUTION INTRAVENOUS at 15:08

## 2022-03-04 RX ADMIN — ATORVASTATIN CALCIUM 80 MG: 80 TABLET, FILM COATED ORAL at 20:23

## 2022-03-04 RX ADMIN — METOPROLOL TARTRATE 12.5 MG: 25 TABLET, FILM COATED ORAL at 20:23

## 2022-03-04 RX ADMIN — ACETAMINOPHEN 1000 MG: 500 TABLET ORAL at 18:23

## 2022-03-04 RX ADMIN — SODIUM CHLORIDE, PRESERVATIVE FREE 10 ML: 5 INJECTION INTRAVENOUS at 09:39

## 2022-03-04 RX ADMIN — ENOXAPARIN SODIUM 40 MG: 100 INJECTION SUBCUTANEOUS at 09:39

## 2022-03-04 RX ADMIN — METOPROLOL TARTRATE 12.5 MG: 25 TABLET, FILM COATED ORAL at 09:00

## 2022-03-04 RX ADMIN — ACETAMINOPHEN 1000 MG: 500 TABLET ORAL at 23:45

## 2022-03-04 RX ADMIN — TAMSULOSIN HYDROCHLORIDE 0.8 MG: 0.4 CAPSULE ORAL at 09:01

## 2022-03-04 RX ADMIN — AMLODIPINE BESYLATE 2.5 MG: 2.5 TABLET ORAL at 08:58

## 2022-03-04 RX ADMIN — ACETAMINOPHEN 1000 MG: 500 TABLET ORAL at 11:55

## 2022-03-04 RX ADMIN — ACETAMINOPHEN 1000 MG: 500 TABLET ORAL at 01:07

## 2022-03-04 RX ADMIN — MORPHINE SULFATE 2 MG: 2 INJECTION, SOLUTION INTRAMUSCULAR; INTRAVENOUS at 10:09

## 2022-03-04 RX ADMIN — SODIUM CHLORIDE: 9 INJECTION, SOLUTION INTRAVENOUS at 01:48

## 2022-03-04 ASSESSMENT — PAIN DESCRIPTION - PAIN TYPE
TYPE: SURGICAL PAIN
TYPE: ACUTE PAIN;SURGICAL PAIN
TYPE: SURGICAL PAIN

## 2022-03-04 ASSESSMENT — PAIN SCALES - GENERAL
PAINLEVEL_OUTOF10: 8
PAINLEVEL_OUTOF10: 0
PAINLEVEL_OUTOF10: 7
PAINLEVEL_OUTOF10: 4
PAINLEVEL_OUTOF10: 7
PAINLEVEL_OUTOF10: 5
PAINLEVEL_OUTOF10: 8
PAINLEVEL_OUTOF10: 6
PAINLEVEL_OUTOF10: 7
PAINLEVEL_OUTOF10: 7

## 2022-03-04 ASSESSMENT — PAIN DESCRIPTION - LOCATION
LOCATION: HIP

## 2022-03-04 ASSESSMENT — PAIN DESCRIPTION - DESCRIPTORS
DESCRIPTORS: ACHING
DESCRIPTORS: SORE

## 2022-03-04 ASSESSMENT — PAIN DESCRIPTION - ORIENTATION
ORIENTATION: RIGHT

## 2022-03-04 ASSESSMENT — PAIN DESCRIPTION - PROGRESSION: CLINICAL_PROGRESSION: NOT CHANGED
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
## 2022-03-04 NOTE — PLAN OF CARE
Problem: Falls - Risk of:  Goal: Absence of physical injury  Description: Absence of physical injury  3/4/2022 0328 by Vikas Le RN  Outcome: Ongoing  3/3/2022 1617 by Buddy Mcgrath RN  Outcome: Met This Shift     Problem: Pain:  Goal: Control of acute pain  Description: Control of acute pain  3/4/2022 0328 by Vikas Le RN  Outcome: Ongoing  3/3/2022 1617 by Buddy Mcgrath RN  Outcome: Ongoing

## 2022-03-04 NOTE — PROGRESS NOTES
Pain appropriately controlled at this time. No events O/N. Blood pressure (!) 121/45, pulse 73, temperature 98.7 °F (37.1 °C), temperature source Oral, resp. rate 16, height 5' 11\" (1.803 m), weight 168 lb 14 oz (76.6 kg), SpO2 94 %. He is alert and oriented x3 evaluation of patient's right hip and lower extremity demonstrates his incision to be clean, dry, intact. He has 2+ pedal pulses distally with brisk capillary refill in his toes. He can actively dorsiflex and plantarflex his foot and toes. Impression and plan: Mr. Yong Lopez is an 80year-old 2 day status post a right hip hemiarthroplasty. - Pain control  - Mobilize with PT. WBAT RLE. No active abduction for 6 weeks  - DVT ppx: Lovenox  - Dressing changed today. Continue with daily dressing changes. - DC planning. Is a candidate for ARU. May DC once arrangements are finalized and bed ready.

## 2022-03-04 NOTE — PROGRESS NOTES
[address]    INPATIENT OCCUPATIONAL THERAPY  PROGRESS NOTE  Date: 3/4/2022  Patient Name: Melania Valerio      Room: 4782/9770-03  MRN: [id number]    : 1934  (80 y.o.) Gender: male     Discharge Recommendations:  Further Occupational Therapy is recommended upon facility discharge. Referring Practitioner: Mauricio Brito MD  Diagnosis: R hip hemiarthroplasty   General  Chart Reviewed: Doyle Lefort and Physical  Patient assessed for rehabilitation services?: Yes  Family / Caregiver Present: Yes (Wife)  Referring Practitioner: Mauricio Brito MD  Diagnosis: R hip hemiarthroplasty     Restrictions  Restrictions/Precautions: Weight Bearing,Fall Risk  Implants present? : Metal implants (left and right hip hemiarthroplasty, sternal wires from CABG)  Other position/activity restrictions: NO ACTIVE R HIP ABDUCTION  Right Lower Extremity Weight Bearing:  (RLE FWB)  Required Braces or Orthoses?: No      Subjective  Subjective: pt states that he [place] a casino. Comments: pt alert. confusion and slight L lateral lean noted. Tod Shows RN notified. co-tx samia ROBLES  Patient Currently in Pain: Yes  Pain Level: 7  Pain Location: Hip  Pain Orientation: Right  Overall Orientation Status: Impaired  Orientation Level: Oriented to situation;Oriented to person;Oriented to time;Disoriented to place (unable to recall current year)     Pain Assessment  Pain Assessment: 0-10  Pain Level: 7  Pain Type: Surgical pain  Pain Location: Hip  Pain Orientation: Right  Pain Descriptors: Aching  Clinical Progression: Not changed  Response to Pain Intervention: Confused    Objective  Cognition  Overall Cognitive Status: Impaired  Arousal/Alertness: Inconsistent responses to stimuli  Following Directions: Follows one step commands  Memory: Decreased recall of biographical information  Following Commands:  Follows one step commands with repetition  Awareness of Errors: Decreased awareness of errors  Sequencing and Organization: Assistance required to identify errors made;Assistance required to generate solutions;Assistance required to implement solutions  Additional Comments: Pt appears confused and drowsy  Bed mobility  Rolling to Left: Maximum assistance  Rolling to Right: Maximum assistance;2 Person assistance  Supine to Sit: Maximum assistance;2 Person assistance  Sit to Supine: Maximum assistance;2 Person assistance  Scooting: Maximal assistance  Comment: VCs for bed rail use, P return noted  Balance  Sitting Balance: Contact guard assistance (sat EOB ~25 minutes, L lateral lean c fatigue,closeSBA-maxA)  Standing Balance: Dependent/Total (AM/PM: max A x2 in flaca strong )  Standing Balance  Time: AM:30-45 seconds x 3 PM: 7 min, 5'30\"  Activity: AM: static stand PM: static stand, wt shifting  Comment: increased L lateral lean noted with fatigue  Functional Mobility  Functional - Mobility Device: Other Cricket Boot sterod)  Activity: Other (bed<>recliner)  Assist Level: Dependent/Total (Max A x2)  Functional Mobility Comments: VCs for hand and feet positioning   ADL  Feeding: Setup  Grooming: Setup  UE Bathing: Contact guard assistance  LE Bathing: Maximum assistance  UE Dressing: Contact guard assistance  LE Dressing: Dependent/Total  Toileting: Dependent/Total  Additional Comments: ADL scores based on skilled observation and clinical reasoning, unless otherwise noted. Assitance required due to cognitive impairments, significant pain, limited mobility, impacting safety and independence with self care. pt educated on AE to increase independence for LB dressing. ie reacher, sock aid. pt demo P return 2* confusion. Transfers  Stand Pivot Transfers: Dependent/Total (Max A x2 in RONNI strong)  Sit to stand: Maximum assistance;2 Person assistance  Stand to sit: Moderate assistance;2 Person assistance  Transfer Comments: VCs for hand/foot placement.  F return noted  Type of ROM/Therapeutic Exercise  Type of ROM/Therapeutic Exercise: Resistive Bands (orange)  Comment: BUEDSON, HEP x 10-15 reps to support mobility and transfers, VCs for counting and correct tech  Exercises  Shoulder Flexion: x  Shoulder Extension: x  Horizontal ABduction: x  Horizontal ADduction: x                          Assessment  Performance deficits / Impairments: Decreased functional mobility ; Decreased ADL status; Decreased ROM; Decreased strength;Decreased safe awareness;Decreased cognition;Decreased endurance;Decreased balance;Decreased high-level IADLs;Decreased fine motor control;Decreased coordination  Prognosis: Good  Discharge Recommendations: Patient would benefit from continued therapy after discharge  Activity Tolerance: Treatment limited secondary to decreased cognition;Patient limited by fatigue  Safety Devices in place: Yes  Type of devices: All fall risk precautions in place;Call light within reach;Gait belt;Patient at risk for falls;Nurse notified; Chair alarm in place; Left in chair (spouse in room as writer departs)  Restraints  Initially in place: No             Patient Education: OT POC, HEP, transfer technique and safety, AE, Activity promotion, no R hip abduction     Learner:patient  Method: demonstration, handout, explaination       Outcome: acknowledged understanding, demonstrated understanding and needs reinforcement     Plan  Safety Devices  Safety Devices in place: Yes  Type of devices:  All fall risk precautions in place,Call light within reach,Gait belt,Patient at risk for falls,Nurse notified,Chair alarm in place,Left in chair (spouse in room as writer departs)  Restraints  Initially in place: No  Plan  Times per week: 5-7 (BID)  Times per day: Twice a day  Current Treatment Recommendations: Strengthening,Balance Training,ROM,Functional Mobility Training,Endurance Training,Cognitive Reorientation,Pain Management,Patient/Caregiver Education & Training,Safety Education & Training,Equipment Evaluation, Education, & procurement,Positioning,Self-Care / ADL      Goals  Short term goals  Time Frame for Short term goals: by discharge  Short term goal 1: Pt will complete functional mobility/transfers during functional activity Max A with RW and Good safety for increased participation in self care  Short term goal 2: Pt will tolerate 5+ minutes of standing Max A with RW and Good safety during functional activity to increase participation in self care  Short term goal 3: Pt will complete LB bathing/dressing/toileting Max A with AE/DME/adaptive techniques and Good safety for increased IND with self care  Short term goal 4: Pt will participate in 15-20 minutes of functional activity for increased strength and activity tolerance for self care  Short term goal 5: Pt will verbalize/demonstrate Good understanding of fall prevention strategies for increased safety in ADL's      Electronically signed by MERLE Hairston on 3/4/22 at 2:32 PM EST          03/04/22 1152 03/04/22 1431   OT Individual Minutes   Time In 4817 2014   Time Out [id number]   ORDTJPQ 21 31

## 2022-03-04 NOTE — PROGRESS NOTES
Notified Melina Mosheim Thierry NANNETTE, that per Dr Amado Wynne pt is approp for ARU. Anticipate bed availability next Monday or Tuesday.

## 2022-03-04 NOTE — PLAN OF CARE
Problem: Pain:  Goal: Control of acute pain  Description: Control of acute pain  3/4/2022 0329 by Stone Rodas RN  Outcome: Ongoing  3/4/2022 0328 by Stone Rodas RN  Outcome: Ongoing  3/3/2022 1617 by Jennifer Thompson RN  Outcome: Ongoing     Problem: Falls - Risk of:  Goal: Absence of physical injury  Description: Absence of physical injury  3/4/2022 0329 by Stone Rodas RN  Outcome: Ongoing     Problem: Falls - Risk of:  Goal: Will remain free from falls  Description: Will remain free from falls  3/4/2022 0329 by Stone Rodas RN  Outcome: Ongoing  3/4/2022 0328 by Stone Rodas RN  Outcome: Ongoing  3/3/2022 1617 by Jennifer Thompson RN  Outcome: Met This Shift

## 2022-03-04 NOTE — PROGRESS NOTES
NANNETTE informed of need for referral to [facility]. SW sent referral and pt was accepted. NANNETTE informed pt's wife who was upset. She said she only wants [facility]. NANNETTE explained bed situation. Wife said [facility] is too far. She then decided on [facility]. NANNETTE sent that referral and pt is accepted. Facility will have a bed on Monday morning. NANNETTE did ask Sara to check on bed availability Monday morning just in case there is an opening.
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
## 2022-03-05 PROCEDURE — 99232 SBSQ HOSP IP/OBS MODERATE 35: CPT | Performed by: INTERNAL MEDICINE

## 2022-03-05 PROCEDURE — 6370000000 HC RX 637 (ALT 250 FOR IP): Performed by: ORTHOPAEDIC SURGERY

## 2022-03-05 PROCEDURE — 2060000000 HC ICU INTERMEDIATE R&B

## 2022-03-05 PROCEDURE — 6360000002 HC RX W HCPCS: Performed by: ORTHOPAEDIC SURGERY

## 2022-03-05 PROCEDURE — 2580000003 HC RX 258: Performed by: ORTHOPAEDIC SURGERY

## 2022-03-05 PROCEDURE — 97530 THERAPEUTIC ACTIVITIES: CPT

## 2022-03-05 RX ORDER — ONDANSETRON 4 MG/1
4 TABLET, ORALLY DISINTEGRATING ORAL EVERY 8 HOURS PRN
Status: CANCELLED | OUTPATIENT
Start: 2022-03-05

## 2022-03-05 RX ORDER — AMLODIPINE BESYLATE 2.5 MG/1
2.5 TABLET ORAL DAILY
Status: CANCELLED | OUTPATIENT
Start: 2022-03-05

## 2022-03-05 RX ORDER — ONDANSETRON 2 MG/ML
4 INJECTION INTRAMUSCULAR; INTRAVENOUS EVERY 6 HOURS PRN
Status: CANCELLED | OUTPATIENT
Start: 2022-03-05

## 2022-03-05 RX ORDER — ATORVASTATIN CALCIUM 80 MG/1
80 TABLET, FILM COATED ORAL NIGHTLY
Status: CANCELLED | OUTPATIENT
Start: 2022-03-05

## 2022-03-05 RX ORDER — TAMSULOSIN HYDROCHLORIDE 0.4 MG/1
0.8 CAPSULE ORAL DAILY
Status: CANCELLED | OUTPATIENT
Start: 2022-03-05

## 2022-03-05 RX ORDER — HYDROCODONE BITARTRATE AND ACETAMINOPHEN 5; 325 MG/1; MG/1
1 TABLET ORAL EVERY 4 HOURS PRN
Status: CANCELLED | OUTPATIENT
Start: 2022-03-05

## 2022-03-05 RX ORDER — HYDROCODONE BITARTRATE AND ACETAMINOPHEN 5; 325 MG/1; MG/1
1 TABLET ORAL EVERY 4 HOURS PRN
Qty: 42 TABLET | Refills: 0 | Status: SHIPPED | OUTPATIENT
Start: 2022-03-05 | End: 2022-03-12

## 2022-03-05 RX ORDER — FINASTERIDE 5 MG/1
5 TABLET, FILM COATED ORAL DAILY
Status: CANCELLED | OUTPATIENT
Start: 2022-03-05

## 2022-03-05 RX ADMIN — SODIUM CHLORIDE: 9 INJECTION, SOLUTION INTRAVENOUS at 06:01

## 2022-03-05 RX ADMIN — SODIUM CHLORIDE: 9 INJECTION, SOLUTION INTRAVENOUS at 18:06

## 2022-03-05 RX ADMIN — METOPROLOL TARTRATE 12.5 MG: 25 TABLET, FILM COATED ORAL at 09:13

## 2022-03-05 RX ADMIN — ACETAMINOPHEN 1000 MG: 500 TABLET ORAL at 12:29

## 2022-03-05 RX ADMIN — AMLODIPINE BESYLATE 2.5 MG: 2.5 TABLET ORAL at 09:13

## 2022-03-05 RX ADMIN — TAMSULOSIN HYDROCHLORIDE 0.8 MG: 0.4 CAPSULE ORAL at 09:13

## 2022-03-05 RX ADMIN — METOPROLOL TARTRATE 12.5 MG: 25 TABLET, FILM COATED ORAL at 20:53

## 2022-03-05 RX ADMIN — FINASTERIDE 5 MG: 5 TABLET, FILM COATED ORAL at 09:13

## 2022-03-05 RX ADMIN — ATORVASTATIN CALCIUM 80 MG: 80 TABLET, FILM COATED ORAL at 20:53

## 2022-03-05 RX ADMIN — ENOXAPARIN SODIUM 40 MG: 100 INJECTION SUBCUTANEOUS at 09:13

## 2022-03-05 RX ADMIN — ACETAMINOPHEN 1000 MG: 500 TABLET ORAL at 06:06

## 2022-03-05 RX ADMIN — ACETAMINOPHEN 650 MG: 325 TABLET, FILM COATED ORAL at 20:53

## 2022-03-05 RX ADMIN — SODIUM CHLORIDE, PRESERVATIVE FREE 10 ML: 5 INJECTION INTRAVENOUS at 09:16

## 2022-03-05 ASSESSMENT — PAIN DESCRIPTION - LOCATION
LOCATION: HIP
LOCATION: GENERALIZED

## 2022-03-05 ASSESSMENT — PAIN SCALES - GENERAL
PAINLEVEL_OUTOF10: 7
PAINLEVEL_OUTOF10: 5
PAINLEVEL_OUTOF10: 5
PAINLEVEL_OUTOF10: 0
PAINLEVEL_OUTOF10: 7
PAINLEVEL_OUTOF10: 4

## 2022-03-05 ASSESSMENT — PAIN DESCRIPTION - PAIN TYPE: TYPE: ACUTE PAIN

## 2022-03-05 ASSESSMENT — PAIN DESCRIPTION - ORIENTATION: ORIENTATION: RIGHT

## 2022-03-05 NOTE — DISCHARGE INSTR - COC
Continuity of Care Form    Patient Name: Michelle Chisholm   :  1934  MRN:  [id number]    Admit date:  3/1/2022  Discharge date:  3/7/33    Code Status Order: Full Code   Advance Directives:      Admitting Physician:  Alicia Herrmann MD  PCP: Jose M Smith MD    Discharging Nurse: [address] Unit/Room#: 2103/2103-01  Discharging Unit Phone Number: [phone]    Emergency Contact:   Extended Emergency Contact Information  Primary Emergency Contact: Ced Uribe  Mobile Phone: [phone]  Relation: Spouse    Past Surgical History:  Past Surgical History:   Procedure Laterality Date    CORONARY ARTERY BYPASS GRAFT      2005    HIP ARTHROPLASTY Left     2020    HIP SURGERY Right 2022    HIP HEMIARTHROPLASTY performed by Alicia Herrmann MD at [address] [place] [address]       Immunization History: There is no immunization history on file for this patient.     Active Problems:  Patient Active Problem List   Diagnosis Code    [facility]    Coronary artery disease involving coronary bypass graft of native heart without angina pectoris I25.810    Hx of CABG Z95.1       Isolation/Infection:   Isolation            No Isolation          Patient Infection Status       None to display            Nurse Assessment:  Last Vital Signs: BP (!) 125/57   Pulse 80   Temp 98.1 °F (36.7 °C) (Oral)   Resp 16   Ht 5' 11\" (1.803 m)   Wt 174 lb 13.2 oz (79.3 kg)   SpO2 94%   BMI 24.38 kg/m²     Last documented pain score (0-10 scale): Pain Level: 5  Last Weight:   Wt Readings from Last 1 Encounters:   22 174 lb 13.2 oz (79.3 kg)     Mental Status:  oriented, alert, coherent, logical, thought processes intact, and able to concentrate and follow conversation    IV Access:  - None    Nursing Mobility/ADLs:  Walking   Assisted  Transfer  Assisted  Bathing  Assisted  Dressing  Assisted  Toileting  Assisted  Feeding  Assisted  Med Admin  Assisted  Med Delivery whole    Wound Care Documentation and Therapy:        Elimination:  Continence: Bowel: Yes  Bladder: No  Urinary Catheter: None   Colostomy/Ileostomy/Ileal Conduit: No       Date of Last BM: 3/6/22    Intake/Output Summary (Last 24 hours) at 3/5/2022 0956  Last data filed at 3/5/2022 0819  Gross per 24 hour   Intake 960 ml   Output 550 ml   Net 410 ml     I/O last 3 completed shifts: In: 831.9 [P.O.:720; IV Piggyback:111.9]  Out: 550 [Urine:550]    Safety Concerns:     History of Falls (last 30 days) and At Risk for Falls    Impairments/Disabilities:      Vision    Nutrition Therapy:  Current Nutrition Therapy:   - Oral Diet:  General    Routes of Feeding: Oral  Liquids: Thin Liquids  Daily Fluid Restriction: no  Last Modified Barium Swallow with Video (Video Swallowing Test): not done    Treatments at the Time of Hospital Discharge:   Respiratory Treatments: n/a  Oxygen Therapy:  is not on home oxygen therapy.   Ventilator:    - No ventilator support    Rehab Therapies: Physical Therapy and Occupational Therapy  Weight Bearing Status/Restrictions: No weight bearing restirctions  Other Medical Equipment (for information only, NOT a DME order):  wheelchair, crutches, and hospital bed  Other Treatments: ***    Patient's personal belongings (please select all that are sent with patient):  Glasses    RN SIGNATURE:  Electronically signed by Lacey Mei RN on 3/7/22 at 2:01 PM EST    CASE MANAGEMENT/SOCIAL WORK SECTION    Inpatient Status Date: 3/7/2022    Readmission Risk Assessment Score:  Readmission Risk              Risk of Unplanned Readmission:  12           Discharging to Facility/ Agency   [facility] [address] [place] [address], [facility]  Phone:  [phone]  Fax:      Dialysis Facility (if applicable)   Name:  Address:  Dialysis Schedule:  Phone:  Fax:    / signature: Electronically signed by OLAYINKA Winn on 3/7/22 at 1:16 PM EST    PHYSICIAN SECTION    Prognosis: Good    Condition at Discharge: Stable    Rehab Potential (if transferring to Rehab): Good    Recommended Labs or Other Treatments After Discharge: ***    Physician Certification: I certify the above information and transfer of Kermit Ge  is necessary for the continuing treatment of the diagnosis listed and that he requires Acute rehab for greater 30 days.      Update Admission H&P: No change in H&P    PHYSICIAN SIGNATURE:  Electronically signed by Atul Bush MD on 3/5/22 at 9:56 AM EST

## 2022-03-05 NOTE — PLAN OF CARE
Problem: Falls - Risk of:  Goal: Will remain free from falls  Description: Will remain free from falls  Outcome: Ongoing     Problem: Falls - Risk of:  Goal: Absence of physical injury  Description: Absence of physical injury  Outcome: Ongoing     Problem: Pain:  Goal: Pain level will decrease  Description: Pain level will decrease  Outcome: Ongoing     Problem: Pain:  Goal: Control of acute pain  Description: Control of acute pain  Outcome: Ongoing     Problem: Skin Integrity:  Goal: Will show no infection signs and symptoms  Description: Will show no infection signs and symptoms  Outcome: Ongoing     Problem: Skin Integrity:  Goal: Absence of new skin breakdown  Description: Absence of new skin breakdown  Outcome: Ongoing     Problem: Activity:  Goal: Ability to ambulate will improve  Description: Ability to ambulate will improve  Outcome: Ongoing     Problem:  Activity:  Goal: Ability to perform activities at highest level will improve  Description: Ability to perform activities at highest level will improve  Outcome: Ongoing     Problem: Sensory:  Goal: Pain level will decrease  Description: Pain level will decrease  Outcome: Ongoing

## 2022-03-05 NOTE — PROGRESS NOTES
Physical Therapy  Facility/Department: [facility]  Daily Treatment Note  NAME: Shyam Burgos  : 1934  MRN: [id number]    Date of Service: 3/5/2022    Discharge Recommendations:  Patient would benefit from continued therapy after discharge   PT Equipment Recommendations  Equipment Needed:  (TBD, spouse would like new wheeled walker)    Assessment   Body structures, Functions, Activity limitations: Decreased functional mobility ; Decreased safe awareness;Decreased balance;Decreased cognition;Decreased ROM; Decreased endurance;Decreased strength; Increased pain  Assessment: pt will need continued therapy at D/C. Treatment Diagnosis: impaired S/P right hip hemiarthroplasty  Prognosis: Good  History: admitted following a fall at the [facility]  Exam: ROM, MMT, balance and mobility assessments  Barriers to Learning: cognition  REQUIRES PT FOLLOW UP: Yes  Activity Tolerance  Activity Tolerance: Patient Tolerated treatment well     Patient Diagnosis(es): The encounter diagnosis was Closed fracture of right hip, initial encounter ([facility] has a past medical history of BPH (benign prostatic hyperplasia), Coronary artery disease involving native coronary artery, and Hypertension. has a past surgical history that includes hip surgery (Right, 2022); Hip Arthroplasty (Left); and Coronary artery bypass graft.     Restrictions  Restrictions/Precautions  Restrictions/Precautions: Weight Bearing,Fall Risk  Required Braces or Orthoses?: No  Implants present? : Metal implants (left and right hip hemiarthroplasty, sternal wires from CABG)  Lower Extremity Weight Bearing Restrictions  Right Lower Extremity Weight Bearing:  (RLE FWB)  Position Activity Restriction  Other position/activity restrictions: NO ACTIVE R HIP ABDUCTION  Subjective   Pain Screening  Patient Currently in Pain: Yes  Pain Assessment  Pain Assessment: 0-10  Pain Level: 4  Pain Location: Hip  Pain Orientation: Right  Vital Signs  Patient Currently in Pain: Yes       Orientation  Orientation  Overall Orientation Status: Within Normal Limits  Cognition      Objective   Bed mobility  Rolling to Left: Moderate assistance  Rolling to Right: Moderate assistance  Supine to Sit: Moderate assistance;2 Person assistance  Sit to Supine:  Moderate assistance  Scooting: Contact guard assistance  Comment: Patient required assistance moving LE into bed     Transfers  Sit to Stand: Minimal Assistance  Stand to sit: Contact guard assistance (VC's for hand placement)  Bed to Chair: Minimal assistance (VC's for walker management)  Lateral Transfers: Contact guard assistance     Ambulation  Ambulation?: Yes  More Ambulation?: No  Ambulation 1  Surface: level tile  Device: Rolling Walker  Assistance: Minimal assistance  Distance: 5 steps chair to bed and back and 3 side steps up to HOB-CGA/MIN A for sequencing and walker management  Stairs/Curb  Stairs?: No     Balance  Sitting - Static: Good  Sitting - Dynamic: Good  Standing - Static: Fair;+  Standing - Dynamic: Fair;+     Exercises  Comments: bilateral LE exercises sitting [id number]     Goals  Short term goals  Time Frame for Short term goals: BID x 5 days  Short term goal 1: pt to tolerate 1/2 hour of therapuetic exercise and activity for ROM and strengthening exercises  Short term goal 2: pt to show increased strength to right hip by 1/2 MMG to assist standing and transfers  Short term goal 3: pt to demonstrate rolling in bed and supine <> sit transfers using log rolling method w/ min x 2  Short term goal 4: pt to demonstrate pull to stand on the Molly Stedy w/ min x 2 and tolerate standing balance 4 minutes w/ min x 2 while maintaining right FWB  Short term goal 5: pt to advance to sit <> stand and bed <> chair transfers right LE FWB using wheeled walker w/ min x 2 after showing good balance and knee control on the [facility]  Short term goal 6: pt to advance ambulating 5-10' right LE FWB using wheeled walker w/ min x 2 after showing good balance and knee control on the [facility]  Patient Goals   Patient goals : return home    Plan    Plan  Times per week: BID x 5 days  Times per day: Twice a day  Specific instructions for Next Treatment: continue w/ BRANDYN STEDY and advance to wheeled walker when ready, instruct in ROM and strengthening exercises  Current Treatment Recommendations: Strengthening,Transfer Training,Balance Training,Gait Training,Functional Mobility Training  Safety Devices  Type of devices: Gait belt,Left in bed,Call light within reach (wife in room with patient)     Therapy Time   Individual Concurrent Group Co-treatment   Time In 22 [id number] (240pm)         Time Out 3641 (302pm)         Minutes 20 (22)                 David Pruitt, PT

## 2022-03-05 NOTE — PROGRESS NOTES
Pain appropriately controlled at this time. No events O/N. Blood pressure (!) 125/57, pulse 80, temperature 98.1 °F (36.7 °C), temperature source Oral, resp. rate 16, height 5' 11\" (1.803 m), weight 174 lb 13.2 oz (79.3 kg), SpO2 94 %. He is alert and oriented x3 evaluation of patient's right hip and lower extremity demonstrates his incision to be clean, dry, intact. He has 2+ pedal pulses distally with brisk capillary refill in his toes. He can actively dorsiflex and plantarflex his foot and toes. Impression and plan: Mr. Paz Jensen is an 80year-old 3 days status post a right hip hemiarthroplasty. - Pain control  - Mobilize with PT. WBAT RLE. No active abduction for 6 weeks  - DVT ppx: Lovenox  - Dressing changed today. Continue with daily dressing changes. - DC planning. No bed availability at [facility] [address] [place]. [facility] has been arranged as an alternatives. May DC once bed ready.
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
## 2022-03-06 PROCEDURE — 6370000000 HC RX 637 (ALT 250 FOR IP): Performed by: ORTHOPAEDIC SURGERY

## 2022-03-06 PROCEDURE — 97530 THERAPEUTIC ACTIVITIES: CPT

## 2022-03-06 PROCEDURE — 99232 SBSQ HOSP IP/OBS MODERATE 35: CPT | Performed by: INTERNAL MEDICINE

## 2022-03-06 PROCEDURE — 6370000000 HC RX 637 (ALT 250 FOR IP): Performed by: INTERNAL MEDICINE

## 2022-03-06 PROCEDURE — 2580000003 HC RX 258: Performed by: ORTHOPAEDIC SURGERY

## 2022-03-06 PROCEDURE — 97116 GAIT TRAINING THERAPY: CPT

## 2022-03-06 PROCEDURE — 6360000002 HC RX W HCPCS: Performed by: ORTHOPAEDIC SURGERY

## 2022-03-06 PROCEDURE — 97110 THERAPEUTIC EXERCISES: CPT

## 2022-03-06 PROCEDURE — 1200000000 HC SEMI PRIVATE

## 2022-03-06 RX ADMIN — ACETAMINOPHEN 1000 MG: 500 TABLET ORAL at 12:39

## 2022-03-06 RX ADMIN — FINASTERIDE 5 MG: 5 TABLET, FILM COATED ORAL at 09:27

## 2022-03-06 RX ADMIN — HYDROCODONE BITARTRATE AND ACETAMINOPHEN 1 TABLET: 5; 325 TABLET ORAL at 09:27

## 2022-03-06 RX ADMIN — SODIUM CHLORIDE: 9 INJECTION, SOLUTION INTRAVENOUS at 19:28

## 2022-03-06 RX ADMIN — TAMSULOSIN HYDROCHLORIDE 0.8 MG: 0.4 CAPSULE ORAL at 09:27

## 2022-03-06 RX ADMIN — ENOXAPARIN SODIUM 40 MG: 100 INJECTION SUBCUTANEOUS at 09:27

## 2022-03-06 RX ADMIN — ACETAMINOPHEN 1000 MG: 500 TABLET ORAL at 19:53

## 2022-03-06 RX ADMIN — SODIUM CHLORIDE, PRESERVATIVE FREE 10 ML: 5 INJECTION INTRAVENOUS at 09:28

## 2022-03-06 RX ADMIN — ATORVASTATIN CALCIUM 80 MG: 80 TABLET, FILM COATED ORAL at 19:53

## 2022-03-06 RX ADMIN — METOPROLOL TARTRATE 12.5 MG: 25 TABLET, FILM COATED ORAL at 19:53

## 2022-03-06 RX ADMIN — METOPROLOL TARTRATE 12.5 MG: 25 TABLET, FILM COATED ORAL at 09:27

## 2022-03-06 RX ADMIN — AMLODIPINE BESYLATE 2.5 MG: 2.5 TABLET ORAL at 09:27

## 2022-03-06 ASSESSMENT — PAIN - FUNCTIONAL ASSESSMENT: PAIN_FUNCTIONAL_ASSESSMENT: PREVENTS OR INTERFERES SOME ACTIVE ACTIVITIES AND ADLS

## 2022-03-06 ASSESSMENT — PAIN DESCRIPTION - LOCATION
LOCATION: HIP
LOCATION: HIP

## 2022-03-06 ASSESSMENT — PAIN DESCRIPTION - DESCRIPTORS: DESCRIPTORS: ACHING

## 2022-03-06 ASSESSMENT — PAIN SCALES - GENERAL
PAINLEVEL_OUTOF10: 7
PAINLEVEL_OUTOF10: 0
PAINLEVEL_OUTOF10: 6
PAINLEVEL_OUTOF10: 2
PAINLEVEL_OUTOF10: 4

## 2022-03-06 ASSESSMENT — PAIN DESCRIPTION - PAIN TYPE
TYPE: SURGICAL PAIN
TYPE: SURGICAL PAIN

## 2022-03-06 ASSESSMENT — PAIN DESCRIPTION - ORIENTATION
ORIENTATION: RIGHT
ORIENTATION: RIGHT

## 2022-03-06 NOTE — CARE COORDINATION
ONGOING DISCHARGE PLANNING NOTE:    Writer reviewed LSW notes, and discharge plan is to discharge to [address] [place] [address] Monday     Electronically signed by Maureen Johnston RN on 3/6/2022 at 8:23 AM
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
## 2022-03-06 NOTE — PLAN OF CARE
Problem: Falls - Risk of:  Goal: Will remain free from falls  Outcome: Ongoing no falls  Goal: Absence of physical injury  Outcome: Ongoing     Problem: Pain:  Description: Pain management should include both nonpharmacologic and pharmacologic interventions. Goal: Pain level will decrease  Outcome: Ongoing pain tolerated prn meds given as needed  Goal: Control of acute pain  Outcome: Ongoing  Goal: Control of chronic pain  Outcome: Ongoing     Problem: Skin Integrity:  Goal: Will show no infection signs and symptoms  Outcome: Ongoing no s/sx of infection  Goal: Absence of new skin breakdown  Outcome: Ongoing no new skin breakdown  Goal: Demonstration of wound healing without infection will improve  Outcome: Ongoing  Goal: Risk for impaired skin integrity will decrease  Outcome: Ongoing     Problem:  Activity:  Goal: Ability to ambulate will improve  Outcome: Ongoing activity as tolerated  Goal: Ability to perform activities at highest level will improve  Outcome: Ongoing assist x 1 with adls     Problem: Health Behavior:  Goal: Identification of resources available to assist in meeting health care needs will improve  Outcome: Ongoing     Problem: Nutritional:  Goal: Ability to attain and maintain optimal nutritional status will improve  Outcome: Ongoing meals encouraged     Problem: Physical Regulation:  Goal: Will remain free from infection  Outcome: Ongoing  Goal: Postoperative complications will be avoided or minimized  Outcome: Ongoing  Goal: Diagnostic test results will improve  Outcome: Ongoing     Problem: Respiratory:  Goal: Ability to maintain adequate ventilation will improve  Outcome: Ongoing no resp issues today     Problem: Safety:  Goal: Ability to remain free from injury will improve  Outcome: Ongoing safety maintained      Problem: Self-Care:  Goal: Ability to meet self-care needs will improve  Outcome: Ongoing adls x 1 assist     Problem: Self-Concept:  Goal: Ability to maintain and perform role responsibilities to the fullest extent possible will improve  Outcome: Ongoing  Goal: Verbalizations of decreased anxiety will increase  Outcome: Ongoing no c/o anxiety     Problem: Sensory:  Goal: Pain level will decrease  Outcome: Ongoing     Problem: Tissue Perfusion:  Goal: Peripheral tissue perfusion will improve  Outcome: Ongoing  Goal: Risk of venous thrombosis will decrease  Outcome: Ongoing     Problem: Urinary Elimination:  Goal: Ability to reestablish a normal urinary elimination pattern will improve - after catheter removal  Outcome: Ongoing     Problem: Musculor/Skeletal Functional Status  Goal: Highest potential functional level  Outcome: Ongoing generalized weakness OT/PT ordered  Goal: Absence of falls  Outcome: Ongoing     Problem: Musculor/Skeletal Functional Status  Goal: Highest potential functional level  Outcome: Ongoing  Goal: Absence of falls  Outcome: Ongoing

## 2022-03-06 NOTE — PROGRESS NOTES
Physical Therapy  Facility/Department: [facility] MED SURG  Daily Treatment Note  NAME: Carri Hutton  : 1934  MRN: [id number]    Date of Service: 3/6/2022  Discharge Recommendations:  Patient would benefit from continued therapy after discharge   PT Equipment Recommendations  Equipment Needed:  (TBD, spouse would like new wheeled walker)    Assessment   Body structures, Functions, Activity limitations: Decreased functional mobility ; Decreased safe awareness;Decreased balance;Decreased cognition;Decreased ROM; Decreased endurance;Decreased strength; Increased pain  Assessment: pt will need continued therapy at D/C. Treatment Diagnosis: impaired S/P right hip hemiarthroplasty  Prognosis: Good  History: admitted following a fall at the [facility]  Exam: ROM, MMT, balance and mobility assessments  Barriers to Learning: cognition  REQUIRES PT FOLLOW UP: Yes  Activity Tolerance  Activity Tolerance: Patient Tolerated treatment well     Patient Diagnosis(es): The encounter diagnosis was Closed fracture of right hip, initial encounter ([facility] [address] has a past medical history of BPH (benign prostatic hyperplasia), Coronary artery disease involving native coronary artery, and Hypertension. has a past surgical history that includes hip surgery (Right, 2022); Hip Arthroplasty (Left); and Coronary artery bypass graft.     Restrictions  Restrictions/Precautions  Restrictions/Precautions: Weight Bearing,Fall Risk  Required Braces or Orthoses?: No  Implants present? : Metal implants (left and right hip hemiarthroplasty, sternal wires from CABG)  Lower Extremity Weight Bearing Restrictions  Right Lower Extremity Weight Bearing: Weight Bearing As Tolerated (RLE FWB)  Position Activity Restriction  Other position/activity restrictions: NO ACTIVE R HIP ABDUCTION  Subjective   Pain Screening  Patient Currently in Pain: Yes  Pain Assessment  Pain Assessment: 0-10  Pain Level: 2  Pain Type: Surgical pain  Pain Location: Hip  Pain Orientation: Right  Vital Signs  Patient Currently in Pain: Yes       Orientation  Orientation  Overall Orientation Status: Within Normal Limits  Cognition      Objective   Bed mobility  Bridging: Supervision  Supine to Sit: Moderate assistance  Sit to Supine:  Moderate assistance  Scooting: Stand by assistance     Transfers  Sit to Stand: Minimal Assistance;Contact guard assistance (VC's for hand placement)  Stand to sit: Contact guard assistance (VC's for hand placement)  Bed to Chair: Minimal assistance (Patient transfered bed to chair x4 using RW CGA)  Lateral Transfers: Contact guard assistance  Comment: VC's for walker sequencing     Ambulation  Ambulation?: Yes  More Ambulation?: No  Ambulation 1  Device: Rolling Walker  Assistance: Contact guard assistance  Gait Deviations: Slow Radha  Distance: 10ft x2  Comments: VC's for walker sequencing and intermittently for LE placement  Stairs/Curb  Stairs?: No     Balance  Sitting - Static: Good  Sitting - Dynamic: Good  Standing - Static: Fair  Standing - Dynamic: Fair  Exercises  Comments: seated bilateral LE exercises x10 erps     Goals  Short term goals  Time Frame for Short term goals: BID x 5 days  Short term goal 1: pt to tolerate 1/2 hour of therapuetic exercise and activity for ROM and strengthening exercises  Short term goal 2: pt to show increased strength to right hip by 1/2 MMG to assist standing and transfers  Short term goal 3: pt to demonstrate rolling in bed and supine <> sit transfers using log rolling method w/ min x 2  Short term goal 4: pt to demonstrate pull to stand on the Molly Stedy w/ min x 2 and tolerate standing balance 4 minutes w/ min x 2 while maintaining right FWB  Short term goal 5: pt to advance to sit <> stand and bed <> chair transfers right LE FWB using wheeled walker w/ min x 2 after showing good balance and knee control on the Jeana Pitcher  Short term goal 6: pt to advance ambulating 5-10' right LE FWB using wheeled walker w/ min x 2 after showing good balance and knee control on the Vevelyn Guthrie  Patient Goals   Patient goals : return home    Plan    Plan  Times per week: BID x 5 days  Times per day: Twice a day  Specific instructions for Next Treatment: continue w/ BRANDYN STEDY and advance to wheeled walker when ready, instruct in ROM and strengthening exercises  Current Treatment Recommendations: Strengthening,Transfer Training,Balance Training,Gait Training,Functional Mobility Training  Safety Devices  Type of devices: Gait belt,Left in bed,Call light within reach,Nurse notified (wife and son  in room with patient)     Therapy Time   Individual Concurrent Group Co-treatment   Time In 0830 (130pm)         Time Out 2824 (225pm)         Minutes 40 (54)                 Diego Velazquez, PT

## 2022-03-07 VITALS
WEIGHT: 172.18 LBS | DIASTOLIC BLOOD PRESSURE: 70 MMHG | SYSTOLIC BLOOD PRESSURE: 129 MMHG | HEART RATE: 86 BPM | RESPIRATION RATE: 16 BRPM | TEMPERATURE: 97.9 F | BODY MASS INDEX: 24.1 KG/M2 | HEIGHT: 71 IN | OXYGEN SATURATION: 95 %

## 2022-03-07 LAB
ABSOLUTE EOS #: 0.5 K/UL (ref 0–0.4)
ABSOLUTE LYMPH #: 1.2 K/UL (ref 1–4.8)
ABSOLUTE MONO #: 1 K/UL (ref 0.1–1.3)
BASOPHILS # BLD: 1 % (ref 0–2)
BASOPHILS ABSOLUTE: 0.1 K/UL (ref 0–0.2)
EOSINOPHILS RELATIVE PERCENT: 7 % (ref 0–4)
HCT VFR BLD CALC: 26.3 % (ref 41–53)
HEMOGLOBIN: 9.1 G/DL (ref 13.5–17.5)
LYMPHOCYTES # BLD: 15 % (ref 24–44)
MCH RBC QN AUTO: 32.8 PG (ref 26–34)
MCHC RBC AUTO-ENTMCNC: 34.7 G/DL (ref 31–37)
MCV RBC AUTO: 94.5 FL (ref 80–100)
MONOCYTES # BLD: 14 % (ref 1–7)
PDW BLD-RTO: 13.5 % (ref 11.5–14.9)
PLATELET # BLD: 223 K/UL (ref 150–450)
PMV BLD AUTO: 7.8 FL (ref 6–12)
RBC # BLD: 2.79 M/UL (ref 4.5–5.9)
SEG NEUTROPHILS: 63 % (ref 36–66)
SEGMENTED NEUTROPHILS ABSOLUTE COUNT: 4.9 K/UL (ref 1.3–9.1)
WBC # BLD: 7.6 K/UL (ref 3.5–11)

## 2022-03-07 PROCEDURE — 6360000002 HC RX W HCPCS: Performed by: ORTHOPAEDIC SURGERY

## 2022-03-07 PROCEDURE — 85025 COMPLETE CBC W/AUTO DIFF WBC: CPT

## 2022-03-07 PROCEDURE — 99231 SBSQ HOSP IP/OBS SF/LOW 25: CPT | Performed by: INTERNAL MEDICINE

## 2022-03-07 PROCEDURE — 6370000000 HC RX 637 (ALT 250 FOR IP): Performed by: ORTHOPAEDIC SURGERY

## 2022-03-07 PROCEDURE — 97110 THERAPEUTIC EXERCISES: CPT

## 2022-03-07 PROCEDURE — 1200000000 HC SEMI PRIVATE

## 2022-03-07 PROCEDURE — 6370000000 HC RX 637 (ALT 250 FOR IP): Performed by: INTERNAL MEDICINE

## 2022-03-07 PROCEDURE — 97116 GAIT TRAINING THERAPY: CPT

## 2022-03-07 PROCEDURE — 97530 THERAPEUTIC ACTIVITIES: CPT

## 2022-03-07 PROCEDURE — 2580000003 HC RX 258: Performed by: ORTHOPAEDIC SURGERY

## 2022-03-07 PROCEDURE — 36415 COLL VENOUS BLD VENIPUNCTURE: CPT

## 2022-03-07 RX ADMIN — TAMSULOSIN HYDROCHLORIDE 0.8 MG: 0.4 CAPSULE ORAL at 09:43

## 2022-03-07 RX ADMIN — SODIUM CHLORIDE: 9 INJECTION, SOLUTION INTRAVENOUS at 09:43

## 2022-03-07 RX ADMIN — HYDROCODONE BITARTRATE AND ACETAMINOPHEN 1 TABLET: 5; 325 TABLET ORAL at 16:06

## 2022-03-07 RX ADMIN — ENOXAPARIN SODIUM 40 MG: 100 INJECTION SUBCUTANEOUS at 09:43

## 2022-03-07 RX ADMIN — METOPROLOL TARTRATE 12.5 MG: 25 TABLET, FILM COATED ORAL at 09:43

## 2022-03-07 RX ADMIN — AMLODIPINE BESYLATE 2.5 MG: 2.5 TABLET ORAL at 09:43

## 2022-03-07 RX ADMIN — FINASTERIDE 5 MG: 5 TABLET, FILM COATED ORAL at 09:43

## 2022-03-07 RX ADMIN — ACETAMINOPHEN 1000 MG: 500 TABLET ORAL at 09:43

## 2022-03-07 RX ADMIN — ACETAMINOPHEN 1000 MG: 500 TABLET ORAL at 00:47

## 2022-03-07 ASSESSMENT — PAIN DESCRIPTION - FREQUENCY
FREQUENCY: CONTINUOUS
FREQUENCY: CONTINUOUS

## 2022-03-07 ASSESSMENT — PAIN SCALES - GENERAL
PAINLEVEL_OUTOF10: 0
PAINLEVEL_OUTOF10: 4
PAINLEVEL_OUTOF10: 2
PAINLEVEL_OUTOF10: 4

## 2022-03-07 ASSESSMENT — PAIN DESCRIPTION - PAIN TYPE
TYPE: SURGICAL PAIN
TYPE: SURGICAL PAIN

## 2022-03-07 ASSESSMENT — PAIN DESCRIPTION - PROGRESSION
CLINICAL_PROGRESSION: GRADUALLY IMPROVING

## 2022-03-07 ASSESSMENT — PAIN DESCRIPTION - LOCATION
LOCATION: HIP
LOCATION: HIP

## 2022-03-07 ASSESSMENT — PAIN - FUNCTIONAL ASSESSMENT: PAIN_FUNCTIONAL_ASSESSMENT: PREVENTS OR INTERFERES WITH ALL ACTIVE AND SOME PASSIVE ACTIVITIES

## 2022-03-07 ASSESSMENT — PAIN DESCRIPTION - ORIENTATION
ORIENTATION: RIGHT
ORIENTATION: RIGHT

## 2022-03-07 ASSESSMENT — PAIN DESCRIPTION - DESCRIPTORS: DESCRIPTORS: ACHING;THROBBING
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
## 2022-03-07 NOTE — DISCHARGE SUMMARY
Discharge Summary    Attending Physician: Lord Angie MD  Admit Date: 3/1/2022  Discharge Date: 3/7/2022   Primary Care Physician: Glo Roberts MD    Admitting Diagnosis:  Right femoral neck fracture     Discharge Diagnoses:  Same as above, sp right hip hemiarthroplasty    Past Medical History:   Diagnosis Date    BPH (benign prostatic hyperplasia)     Coronary artery disease involving native coronary artery     Hypertension        Procedures Performed and Findings  Procedure(s):  HIP HEMIARTHROPLASTY     Consultations Obtained  IP CONSULT TO ORTHOPEDIC SURGERY  IP CONSULT TO INTERNAL MEDICINE  IP CONSULT TO CARDIOLOGY  IP CONSULT TO CASE MANAGEMENT  IP CONSULT TO [facility] 6 Course  Patient was admitted to [facility] to undergo a right hip hemiarthroplasty after he tripped and fell, sustaining a right femoral neck fracture. he consented to proceed with surgery after demonstrating an understanding of the discussion we had regarding treatment options, risks, and benefits of the proposed procedure, expected outcome, and postoperative course. The above mentioned procedure was performed under a general anesthetic and patient tolerated the procedure well. Once surgery was completed the patient was taken to recovery and transferred to his room in stable condition where he finished a course of perioperative antibiotics. His home diet and home medications were resumed. He was able to achieve adequate pain relief with a combination of IV and oral pain medications and eventually just oral analgesics. The patient was placed on lovenox for 3 weeks for DVT prophylaxis. Being medically stable and demonstrating appropriate progress with physical therapy, on 3/7/2022  the patient was discharged to [facility] with a follow up appointment with Dr. Sampson Zhang in 2 weeks.     Discharge Medications       Medication List      START taking these medications    enoxaparin 40 MG/0.4ML injection  Commonly known as: LOVENOX  Inject 0.4 mLs into the skin daily for 21 days     HYDROcodone-acetaminophen 5-325 MG per tablet  Commonly known as: NORCO  Take 1 tablet by mouth every 4 hours as needed for Pain for up to 7 days.         CONTINUE taking these medications    amLODIPine 2.5 MG tablet  Commonly known as: NORVASC     aspirin 325 MG EC tablet     atorvastatin 80 MG tablet  Commonly known as: LIPITOR     finasteride 5 MG tablet  Commonly known as: PROSCAR     fish oil 1000 MG Caps     metoprolol tartrate 25 MG tablet  Commonly known as: LOPRESSOR     tamsulosin 0.4 MG capsule  Commonly known as: FLOMAX     therapeutic multivitamin-minerals tablet     vitamin D 25 MCG (1000 UT) Tabs tablet  Commonly known as: CHOLECALCIFEROL           Where to Get Your Medications      You can get these medications from any pharmacy    Bring a paper prescription for each of these medications  · enoxaparin 40 MG/0.4ML injection  · HYDROcodone-acetaminophen 5-325 MG per tablet          Discharge Disposition  [facility]    Activity on Discharge  WBAT RLE    Discharge Instructions  Change dressings daily    Follow-Up Scheduled    Marj Iverson MD  [address] [facility] [address] [facility]    Schedule an appointment as soon as possible for a visit in 2 weeks  For suture removal, For wound re-check
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
## 2022-03-07 NOTE — PLAN OF CARE
Problem: Falls - Risk of:  Goal: Will remain free from falls  3/7/2022 1815 by Thais Bro RN  Outcome: Ongoing no falls  3/7/2022 0428 by Alex Whittaker RN  Outcome: Ongoing  Goal: Absence of physical injury  3/7/2022 1815 by Thais Bro RN  Outcome: Ongoing  3/7/2022 0428 by Alex Whittaker RN  Outcome: Ongoing     Problem: Pain:  Description: Pain management should include both nonpharmacologic and pharmacologic interventions. Goal: Pain level will decrease  3/7/2022 1815 by Thais Bro RN  Outcome: Ongoing medicated prn for pain  3/7/2022 0428 by Alex Whittaker RN  Outcome: Ongoing  Goal: Control of acute pain  3/7/2022 1815 by Thais Bro RN  Outcome: Ongoing pain tolerable  3/7/2022 0428 by Alex Whittaker RN  Outcome: Ongoing  Goal: Control of chronic pain  3/7/2022 1815 by Thais Bro RN  Outcome: Ongoing  3/7/2022 0428 by Alex Whittaker RN  Outcome: Ongoing     Problem: Skin Integrity:  Goal: Will show no infection signs and symptoms  3/7/2022 1815 by Thais Bro RN  Outcome: Ongoing dressing change to rt hip as ordered  3/7/2022 0428 by Alex Whittaker RN  Outcome: Ongoing  Goal: Absence of new skin breakdown  3/7/2022 1815 by Thais Bro RN  Outcome: Ongoing no new skin issues  3/7/2022 0428 by Alex Whittaker RN  Outcome: Ongoing  Goal: Demonstration of wound healing without infection will improve  3/7/2022 1815 by Thais Bro RN  Outcome: Ongoing  3/7/2022 0428 by Alex Whittaker RN  Outcome: Ongoing  Goal: Risk for impaired skin integrity will decrease  3/7/2022 1815 by Tahis Bro RN  Outcome: Ongoing  3/7/2022 0428 by Alex Whittaker RN  Outcome: Ongoing     Problem:  Activity:  Goal: Ability to ambulate will improve  3/7/2022 1815 by Thais Bro RN  Outcome: Ongoing working with ot/pt fatigues easily  3/7/2022 0428 by Alex Whittaker RN  Outcome: Ongoing  Goal: Ability to perform activities at highest level will improve  3/7/2022 1815 by Thais Bro RN  Outcome: Ongoing  3/7/2022 0428 by Nick Rodríguez RN  Outcome: Ongoing     Problem: Health Behavior:  Goal: Identification of resources available to assist in meeting health care needs will improve  3/7/2022 1815 by Viridiana Dye RN  Outcome: Ongoing  3/7/2022 0428 by Nick Rodríguez RN  Outcome: Ongoing     Problem: Nutritional:  Goal: Ability to attain and maintain optimal nutritional status will improve  3/7/2022 1815 by Viridiana Dye RN  Outcome: Ongoing poor appetite, meals encouraged  3/7/2022 0428 by Nick Rodríguez RN  Outcome: Ongoing     Problem: Physical Regulation:  Goal: Will remain free from infection  3/7/2022 1815 by Viridiana Dye RN  Outcome: Ongoing  3/7/2022 0428 by Nick Rodríguez RN  Outcome: Ongoing  Goal: Postoperative complications will be avoided or minimized  3/7/2022 1815 by Viridiana Dye RN  Outcome: Ongoing  3/7/2022 0428 by Nick Rodríguez RN  Outcome: Ongoing  Goal: Diagnostic test results will improve  3/7/2022 1815 by Viridiana Dye RN  Outcome: Ongoing  3/7/2022 0428 by Nick Rodríguez RN  Outcome: Ongoing     Problem: Respiratory:  Goal: Ability to maintain adequate ventilation will improve  3/7/2022 1815 by Viridiana Dye RN  Outcome: Ongoing resp easy and unlabored on room air  3/7/2022 0428 by Nick Rodríguez RN  Outcome: Ongoing     Problem: Safety:  Goal: Ability to remain free from injury will improve  3/7/2022 1815 by Viridiana Dye RN  Outcome: Ongoing safety maintained  3/7/2022 0428 by Nick Rodríguez RN  Outcome: Ongoing     Problem: Self-Care:  Goal: Ability to meet self-care needs will improve  3/7/2022 1815 by Viridiana Dye RN  Outcome: Ongoing assist x 1-2 with adl's  3/7/2022 0428 by Nick Rodrgíuez RN  Outcome: Ongoing     Problem: Self-Concept:  Goal: Ability to maintain and perform role responsibilities to the fullest extent possible will improve  3/7/2022 1815 by Viridiana Dye RN  Outcome: Ongoing  3/7/2022 0428 by Nick Rodríguez RN  Outcome: Ongoing  Goal: Verbalizations of decreased anxiety will increase  3/7/2022 1815 by Kathleen Elias RN  Outcome: Ongoing anxious at times  3/7/2022 0428 by Macho Davis RN  Outcome: Ongoing     Problem: Sensory:  Goal: Pain level will decrease  3/7/2022 1815 by Kathleen Elias RN  Outcome: Ongoing  3/7/2022 0428 by Macho Davis RN  Outcome: Ongoing     Problem: Tissue Perfusion:  Goal: Peripheral tissue perfusion will improve  3/7/2022 1815 by Kathleen Elias RN  Outcome: Ongoing vss  3/7/2022 0428 by Macho Davis RN  Outcome: Ongoing  Goal: Risk of venous thrombosis will decrease  3/7/2022 1815 by Kathleen Elias RN  Outcome: Ongoing  3/7/2022 0428 by Macho Davis RN  Outcome: Ongoing     Problem: Urinary Elimination:  Goal: Ability to reestablish a normal urinary elimination pattern will improve - after catheter removal  3/7/2022 1815 by Kathleen Elias RN  Outcome: Ongoing  3/7/2022 0428 by Macho Davis RN  Outcome: Ongoing     Problem: Musculor/Skeletal Functional Status  Goal: Highest potential functional level  3/7/2022 1815 by Kathleen Elias RN  Outcome: Ongoing generalized weakness  3/7/2022 0428 by Macho Davis RN  Outcome: Ongoing  Goal: Absence of falls  3/7/2022 1815 by Kathleen Elias RN  Outcome: Ongoing  3/7/2022 0428 by Macho Davis RN  Outcome: Ongoing     Problem: Musculor/Skeletal Functional Status  Goal: Highest potential functional level  3/7/2022 1815 by Kathleen Elias, RN  Outcome: Ongoing  3/7/2022 0428 by Macho Davis RN  Outcome: Ongoing  Goal: Absence of falls  3/7/2022 1815 by Kathleen Elias, RN  Outcome: Ongoing  3/7/2022 0428 by Macho Davis RN  Outcome: Ongoing
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
## 2022-03-07 NOTE — PLAN OF CARE
Problem: Falls - Risk of:  Goal: Will remain free from falls  Description: Will remain free from falls  3/7/2022 0428 by Tania William RN  Outcome: Ongoing  3/6/2022 1602 by Jj Steve RN  Outcome: Ongoing  Goal: Absence of physical injury  Description: Absence of physical injury  3/7/2022 0428 by Tania William RN  Outcome: Ongoing  3/6/2022 1602 by Jj Steve RN  Outcome: Ongoing     Problem: Pain:  Goal: Pain level will decrease  Description: Pain level will decrease  3/7/2022 0428 by Tania William RN  Outcome: Ongoing  3/6/2022 1602 by Jj Steve RN  Outcome: Ongoing  Goal: Control of acute pain  Description: Control of acute pain  3/7/2022 0428 by Tania William RN  Outcome: Ongoing  3/6/2022 1602 by Jj Steve RN  Outcome: Ongoing  Goal: Control of chronic pain  Description: Control of chronic pain  3/7/2022 0428 by Tania William RN  Outcome: Ongoing  3/6/2022 1602 by Jj Steve RN  Outcome: Ongoing     Problem: Skin Integrity:  Goal: Will show no infection signs and symptoms  Description: Will show no infection signs and symptoms  3/7/2022 0428 by Tania William RN  Outcome: Ongoing  3/6/2022 1602 by Jj Steve RN  Outcome: Ongoing  Goal: Absence of new skin breakdown  Description: Absence of new skin breakdown  3/7/2022 0428 by Tania William RN  Outcome: Ongoing  3/6/2022 1602 by Jj Steve RN  Outcome: Ongoing  Goal: Demonstration of wound healing without infection will improve  Description: Demonstration of wound healing without infection will improve  3/7/2022 0428 by Tania William RN  Outcome: Ongoing  3/6/2022 1602 by Jj Steve RN  Outcome: Ongoing  Goal: Risk for impaired skin integrity will decrease  Description: Risk for impaired skin integrity will decrease  3/7/2022 0428 by Tania William RN  Outcome: Ongoing  3/6/2022 1602 by Jj Steve RN  Outcome: Ongoing     Problem:  Activity:  Goal: Ability to ambulate will improve  Description: Ability to ambulate will improve  3/7/2022 0428 by Christi Carrasquillo RN  Outcome: Ongoing  3/6/2022 1602 by Maren Calabrese RN  Outcome: Ongoing  Goal: Ability to perform activities at highest level will improve  Description: Ability to perform activities at highest level will improve  3/7/2022 0428 by Christi Carrasquillo RN  Outcome: Ongoing  3/6/2022 1602 by Maren Calabrese RN  Outcome: Ongoing     Problem: Health Behavior:  Goal: Identification of resources available to assist in meeting health care needs will improve  Description: Identification of resources available to assist in meeting health care needs will improve  3/7/2022 0428 by Christi Carrasquillo RN  Outcome: Ongoing  3/6/2022 1602 by Maren Calabrese RN  Outcome: Ongoing     Problem: Nutritional:  Goal: Ability to attain and maintain optimal nutritional status will improve  Description: Ability to attain and maintain optimal nutritional status will improve  3/7/2022 0428 by Christi Carrasquillo RN  Outcome: Ongoing  3/6/2022 1602 by Maren Calabrese RN  Outcome: Ongoing     Problem: Physical Regulation:  Goal: Will remain free from infection  Description: Will remain free from infection  3/7/2022 0428 by Christi Carrasquillo RN  Outcome: Ongoing  3/6/2022 1602 by Maren Calabrese RN  Outcome: Ongoing  Goal: Postoperative complications will be avoided or minimized  Description: Postoperative complications will be avoided or minimized  3/7/2022 0428 by Christi Carrasquillo RN  Outcome: Ongoing  3/6/2022 1602 by Maren Calabrese RN  Outcome: Ongoing  Goal: Diagnostic test results will improve  Description: Diagnostic test results will improve  3/7/2022 0428 by Christi Carrasquillo RN  Outcome: Ongoing  3/6/2022 1602 by Maren Calabrese RN  Outcome: Ongoing     Problem: Respiratory:  Goal: Ability to maintain adequate ventilation will improve  Description: Ability to maintain adequate ventilation will improve  3/7/2022 0428 by Christi Carrasquillo RN  Outcome: Ongoing  3/6/2022 1602 by Maren Calabrese RN  Outcome: Ongoing     Problem: Safety:  Goal: Ability to remain free from injury will improve  Description: Ability to remain free from injury will improve  3/7/2022 0428 by Connie Cedillo RN  Outcome: Ongoing  3/6/2022 1602 by Kathryn Clark RN  Outcome: Ongoing     Problem: Self-Care:  Goal: Ability to meet self-care needs will improve  Description: Ability to meet self-care needs will improve  3/7/2022 0428 by Connie Cedillo RN  Outcome: Ongoing  3/6/2022 1602 by Kathryn Clark RN  Outcome: Ongoing     Problem: Self-Concept:  Goal: Ability to maintain and perform role responsibilities to the fullest extent possible will improve  Description: Ability to maintain and perform role responsibilities to the fullest extent possible will improve  3/7/2022 0428 by Connie Cedillo RN  Outcome: Ongoing  3/6/2022 1602 by Kathryn Clark RN  Outcome: Ongoing  Goal: Verbalizations of decreased anxiety will increase  Description: Verbalizations of decreased anxiety will increase  3/7/2022 0428 by Connie Cedillo RN  Outcome: Ongoing  3/6/2022 1602 by Kathryn Clark RN  Outcome: Ongoing     Problem: Sensory:  Goal: Pain level will decrease  Description: Pain level will decrease  3/7/2022 0428 by Connie Cedillo RN  Outcome: Ongoing  3/6/2022 1602 by Kathryn Clark RN  Outcome: Ongoing     Problem: Tissue Perfusion:  Goal: Peripheral tissue perfusion will improve  Description: Peripheral tissue perfusion will improve  3/7/2022 0428 by Connie Cedillo RN  Outcome: Ongoing  3/6/2022 1602 by Kathryn Clark RN  Outcome: Ongoing  Goal: Risk of venous thrombosis will decrease  Description: Risk of venous thrombosis will decrease  3/7/2022 0428 by Connie Cedillo RN  Outcome: Ongoing  3/6/2022 1602 by Kathryn Clark RN  Outcome: Ongoing     Problem: Urinary Elimination:  Goal: Ability to reestablish a normal urinary elimination pattern will improve - after catheter removal  Description: Ability to reestablish a normal urinary elimination pattern will improve  3/7/2022 0428 by Sly Iqbal RN  Outcome: Ongoing  3/6/2022 1602 by Abdirizak Thayer RN  Outcome: Ongoing     Problem: Musculor/Skeletal Functional Status  Goal: Highest potential functional level  3/7/2022 0428 by Sly Iqbal RN  Outcome: Ongoing  3/6/2022 1602 by Abdirizak Thayer RN  Outcome: Ongoing  Goal: Absence of falls  3/7/2022 0428 by Sly Iqbal RN  Outcome: Ongoing  3/6/2022 1602 by Abdirizak Thayer RN  Outcome: Ongoing     Problem: Musculor/Skeletal Functional Status  Goal: Highest potential functional level  3/7/2022 0428 by Sly Iqbal RN  Outcome: Ongoing  3/6/2022 1602 by Abdirizak Thayer RN  Outcome: Ongoing  Goal: Absence of falls  3/7/2022 0428 by Sly Iqbal RN  Outcome: Ongoing  3/6/2022 1602 by Abdirizak Thayer RN  Outcome: Ongoing

## 2022-03-07 NOTE — PROGRESS NOTES
Pain appropriately controlled at this time. No events O/N. Blood pressure 127/74, pulse 72, temperature 98.2 °F (36.8 °C), temperature source Oral, resp. rate 16, height 5' 11\" (1.803 m), weight 172 lb 2.9 oz (78.1 kg), SpO2 94 %. He is alert and oriented x3 evaluation of patient's right hip and lower extremity demonstrates his incision to be clean, dry, intact. He has 2+ pedal pulses distally with brisk capillary refill in his toes. He can actively dorsiflex and plantarflex his foot and toes. Impression and plan: Mr. Pamela Rider is an 80year-old 5 days status post a right hip hemiarthroplasty. - Pain control  - Mobilize with PT. WBAT RLE. No active abduction for 6 weeks  - DVT ppx: Lovenox  - Continue with daily dressing changes.    - Dispo: DC to [facility] today and follow up 2 weeks postop

## 2022-03-08 ENCOUNTER — HOSPITAL ENCOUNTER (OUTPATIENT)
Age: 87
Setting detail: SPECIMEN
Discharge: HOME | End: 2022-03-08

## 2022-03-08 LAB
ANION GAP SERPL CALCULATED.3IONS-SCNC: 16 MMOL/L (ref 9–17)
BUN BLDV-MCNC: 17 MG/DL (ref 8–23)
CALCIUM SERPL-MCNC: 8.4 MG/DL (ref 8.6–10.4)
CHLORIDE BLD-SCNC: 107 MMOL/L (ref 98–107)
CO2: 21 MMOL/L (ref 20–31)
CREAT SERPL-MCNC: 0.84 MG/DL (ref 0.7–1.2)
GFR AFRICAN AMERICAN: >60 ML/MIN
GFR NON-AFRICAN AMERICAN: >60 ML/MIN
GFR SERPL CREATININE-BSD FRML MDRD: ABNORMAL ML/MIN/{1.73_M2}
GLUCOSE BLD-MCNC: 94 MG/DL (ref 70–99)
HCT VFR BLD CALC: 28.7 % (ref 40.7–50.3)
HEMOGLOBIN: 9.4 G/DL (ref 13–17)
MCH RBC QN AUTO: 32.5 PG (ref 25.2–33.5)
MCHC RBC AUTO-ENTMCNC: 32.8 G/DL (ref 28.4–34.8)
MCV RBC AUTO: 99.3 FL (ref 82.6–102.9)
NRBC AUTOMATED: 0 PER 100 WBC
PDW BLD-RTO: 12.8 % (ref 11.8–14.4)
PLATELET # BLD: 263 K/UL (ref 138–453)
PMV BLD AUTO: 10.2 FL (ref 8.1–13.5)
POTASSIUM SERPL-SCNC: 4.7 MMOL/L (ref 3.7–5.3)
RBC # BLD: 2.89 M/UL (ref 4.21–5.77)
SODIUM BLD-SCNC: 144 MMOL/L (ref 135–144)
WBC # BLD: 10.5 K/UL (ref 3.5–11.3)

## 2022-03-08 PROCEDURE — P9603 ONE-WAY ALLOW PRORATED MILES: HCPCS

## 2022-03-08 PROCEDURE — 85027 COMPLETE CBC AUTOMATED: CPT

## 2022-03-08 PROCEDURE — 80048 BASIC METABOLIC PNL TOTAL CA: CPT

## 2022-03-08 PROCEDURE — 36415 COLL VENOUS BLD VENIPUNCTURE: CPT

## 2022-03-15 ENCOUNTER — HOSPITAL ENCOUNTER (OUTPATIENT)
Age: 87
Setting detail: SPECIMEN
Discharge: HOME | End: 2022-03-15

## 2022-03-15 LAB
ANION GAP SERPL CALCULATED.3IONS-SCNC: 10 MMOL/L (ref 9–17)
BUN BLDV-MCNC: 17 MG/DL (ref 8–23)
CALCIUM SERPL-MCNC: 8.6 MG/DL (ref 8.6–10.4)
CHLORIDE BLD-SCNC: 105 MMOL/L (ref 98–107)
CO2: 24 MMOL/L (ref 20–31)
CREAT SERPL-MCNC: 0.84 MG/DL (ref 0.7–1.2)
GFR AFRICAN AMERICAN: >60 ML/MIN
GFR NON-AFRICAN AMERICAN: >60 ML/MIN
GFR SERPL CREATININE-BSD FRML MDRD: NORMAL ML/MIN/{1.73_M2}
GLUCOSE BLD-MCNC: 88 MG/DL (ref 70–99)
HCT VFR BLD CALC: 32.2 % (ref 40.7–50.3)
HEMOGLOBIN: 10.1 G/DL (ref 13–17)
MCH RBC QN AUTO: 32 PG (ref 25.2–33.5)
MCHC RBC AUTO-ENTMCNC: 31.4 G/DL (ref 28.4–34.8)
MCV RBC AUTO: 101.9 FL (ref 82.6–102.9)
NRBC AUTOMATED: 0 PER 100 WBC
PDW BLD-RTO: 14.1 % (ref 11.8–14.4)
PLATELET # BLD: 501 K/UL (ref 138–453)
PMV BLD AUTO: 9.6 FL (ref 8.1–13.5)
POTASSIUM SERPL-SCNC: 4.2 MMOL/L (ref 3.7–5.3)
RBC # BLD: 3.16 M/UL (ref 4.21–5.77)
SODIUM BLD-SCNC: 139 MMOL/L (ref 135–144)
WBC # BLD: 11 K/UL (ref 3.5–11.3)

## 2022-03-15 PROCEDURE — P9603 ONE-WAY ALLOW PRORATED MILES: HCPCS

## 2022-03-15 PROCEDURE — 85027 COMPLETE CBC AUTOMATED: CPT

## 2022-03-15 PROCEDURE — 36415 COLL VENOUS BLD VENIPUNCTURE: CPT

## 2022-03-15 PROCEDURE — 80048 BASIC METABOLIC PNL TOTAL CA: CPT

## 2022-03-18 ENCOUNTER — OFFICE VISIT (OUTPATIENT)
Dept: ORTHOPEDIC SURGERY | Age: 87
End: 2022-03-18

## 2022-03-18 VITALS — WEIGHT: 158 LBS | BODY MASS INDEX: 22.12 KG/M2 | HEIGHT: 71 IN

## 2022-03-18 DIAGNOSIS — Z96.649 STATUS POST HIP HEMIARTHROPLASTY: Primary | ICD-10-CM

## 2022-03-18 PROCEDURE — 99024 POSTOP FOLLOW-UP VISIT: CPT | Performed by: ORTHOPAEDIC SURGERY

## 2022-03-22 ENCOUNTER — HOSPITAL ENCOUNTER (OUTPATIENT)
Age: 87
Setting detail: SPECIMEN
Discharge: HOME | End: 2022-03-22

## 2022-03-22 LAB
ANION GAP SERPL CALCULATED.3IONS-SCNC: 15 MMOL/L (ref 9–17)
BUN BLDV-MCNC: 18 MG/DL (ref 8–23)
CALCIUM SERPL-MCNC: 8.9 MG/DL (ref 8.6–10.4)
CHLORIDE BLD-SCNC: 103 MMOL/L (ref 98–107)
CO2: 23 MMOL/L (ref 20–31)
CREAT SERPL-MCNC: 0.91 MG/DL (ref 0.7–1.2)
GFR AFRICAN AMERICAN: >60 ML/MIN
GFR NON-AFRICAN AMERICAN: >60 ML/MIN
GFR SERPL CREATININE-BSD FRML MDRD: ABNORMAL ML/MIN/{1.73_M2}
GLUCOSE BLD-MCNC: 116 MG/DL (ref 70–99)
HCT VFR BLD CALC: 34.9 % (ref 40.7–50.3)
HEMOGLOBIN: 11.4 G/DL (ref 13–17)
MCH RBC QN AUTO: 32.9 PG (ref 25.2–33.5)
MCHC RBC AUTO-ENTMCNC: 32.7 G/DL (ref 28.4–34.8)
MCV RBC AUTO: 100.6 FL (ref 82.6–102.9)
NRBC AUTOMATED: 0 PER 100 WBC
PDW BLD-RTO: 14.4 % (ref 11.8–14.4)
PLATELET # BLD: 345 K/UL (ref 138–453)
PMV BLD AUTO: 9.6 FL (ref 8.1–13.5)
POTASSIUM SERPL-SCNC: 3.9 MMOL/L (ref 3.7–5.3)
RBC # BLD: 3.47 M/UL (ref 4.21–5.77)
SODIUM BLD-SCNC: 141 MMOL/L (ref 135–144)
WBC # BLD: 7.3 K/UL (ref 3.5–11.3)

## 2022-03-22 PROCEDURE — P9603 ONE-WAY ALLOW PRORATED MILES: HCPCS

## 2022-03-22 PROCEDURE — 85027 COMPLETE CBC AUTOMATED: CPT

## 2022-03-22 PROCEDURE — 80048 BASIC METABOLIC PNL TOTAL CA: CPT

## 2022-03-22 PROCEDURE — 36415 COLL VENOUS BLD VENIPUNCTURE: CPT

## 2022-03-30 ENCOUNTER — TELEPHONE (OUTPATIENT)
Dept: ORTHOPEDIC SURGERY | Age: 87
End: 2022-03-30

## 2022-03-30 DIAGNOSIS — Z96.649 STATUS POST HIP HEMIARTHROPLASTY: Primary | ICD-10-CM

## 2022-03-30 DIAGNOSIS — M25.559 HIP PAIN: ICD-10-CM

## 2022-03-30 NOTE — TELEPHONE ENCOUNTER
Bertha Nj from [facility] is asking for a return call in regarding patients restrictions. She is also asking if an out patient PT referral could be faxed for him to start in about 2 weeks or does Eren Vera need to be seen prior to that referral being places. Please contact her directly to discuss. Thank you. Mar 30 - [phone].   Fax - [phone]

## 2022-04-01 NOTE — TELEPHONE ENCOUNTER
He can be provided with a prescription for PT working on gait, ROM and strengthening of his hip and LE. Yes, he is to avoid active hip abduction for the first 6 weeks postop.

## 2022-04-01 NOTE — TELEPHONE ENCOUNTER
Called to speak with Sabino and let her know that pt is to no actively abduct the hip for the first 6 week. She also stated that we can fax the PT referral to her and she will give it to the pt. Referral was faxed to number listed below.

## 2022-04-15 ENCOUNTER — OFFICE VISIT (OUTPATIENT)
Dept: ORTHOPEDIC SURGERY | Age: 87
End: 2022-04-15

## 2022-04-15 VITALS — HEIGHT: 71 IN | WEIGHT: 158 LBS | RESPIRATION RATE: 12 BRPM | BODY MASS INDEX: 22.12 KG/M2

## 2022-04-15 DIAGNOSIS — Z96.649 STATUS POST HIP HEMIARTHROPLASTY: Primary | ICD-10-CM

## 2022-04-15 PROCEDURE — 99024 POSTOP FOLLOW-UP VISIT: CPT | Performed by: ORTHOPAEDIC SURGERY

## 2022-04-15 NOTE — PROGRESS NOTES
Procedure: Right hip hemiarthroplasty  Date of procedure: 3/2/2022    HPI: Jake Zamudio is a 80 y.o. old male who is approximately 6 weeks sp the aforementioned procedure. Indicates that he is doing well overall. His pain is relatively minimal rating it as a 1/10. He continues to work in physical therapy and ambulates with the aid of a walker. Physical Exam:  Resp 12   Ht 5' 11\" (1.803 m)   Wt 158 lb (71.7 kg)   BMI 22.04 kg/m²   General Appearance: alert, well appearing, and in no distress  Mental Status: alert, oriented to person, place, and time  Evaluation of the right hip and lower extremity demonstrates his incisions to be healed. He has pretty good range of motion through his right hip. Sensation is grossly intact to light touch in all dermatomes and he has 2+ pedal pulses distally. Imaging Studies: An AP of the pelvis and AP and lateral views of the right hip were obtained and reviewed independently on 4/15/2022 demonstrating a cemented hemiarthroplasty to be in acceptable alignment without any obvious loosening, migration, subluxation, or dislocation. Impression and plan: Jake Zamudio is a 80 y.o. old male who is approximately 6 weeks sp a right hip hemiarthroplasty. He is doing fairly well clinically. He was encouraged at this time to keep up with physical therapy working on his rehabilitation. He may continue weightbearing as tolerated using his walker. I will see him back for reevaluation in 6 weeks but he may return or call earlier with any questions or concerns.

## 2022-06-01 ENCOUNTER — OFFICE VISIT (OUTPATIENT)
Dept: ORTHOPEDIC SURGERY | Age: 87
End: 2022-06-01

## 2022-06-01 VITALS — WEIGHT: 157 LBS | BODY MASS INDEX: 21.98 KG/M2 | HEIGHT: 71 IN

## 2022-06-01 DIAGNOSIS — Z96.649 STATUS POST HIP HEMIARTHROPLASTY: Primary | ICD-10-CM

## 2022-06-01 PROCEDURE — 99024 POSTOP FOLLOW-UP VISIT: CPT | Performed by: ORTHOPAEDIC SURGERY

## 2022-06-01 NOTE — PROGRESS NOTES
Procedure: Right hip hemiarthroplasty  Date of procedure: 3/2/2022    HPI: Lico Ram is a 80 y.o. old male who is approximately 3 months sp the aforementioned procedure. He indicates that he continues to do well. His pain is minimal in the hip. He does have more pain in the lower back after prolonged walking. Therapy is going well for him. He is ambulating with the aid of a cane at this time. Physical Exam:  Ht 5' 11\" (1.803 m)   Wt 157 lb (71.2 kg)   BMI 21.90 kg/m²   General Appearance: alert, well appearing, and in no distress  Mental Status: alert, oriented to person, place, and time  Evaluation of the right hip and lower extremity demonstrates his incisions to be healed. He has good range of motion through his right hip without any pain. Sensation is grossly intact to light touch in all dermatomes and he has 2+ pedal pulses distally. Imaging Studies: An AP of the pelvis and AP and lateral views of the right hip were obtained and reviewed independently on 6/1/2022 demonstrating a cemented hemiarthroplasty to be in acceptable alignment without any obvious loosening, migration, subluxation, or dislocation. Impression and plan: Lico Ram is a 80 y.o. old male who is approximately 3 months sp a right hip hemiarthroplasty. He continues to do very well at this time. He was encouraged to keep up with physical therapy. He may continue weightbearing as tolerated using his cane. I will see him back for reevaluation in 3 months but he may return or call earlier with any questions or concerns.

## 2022-09-02 ENCOUNTER — OFFICE VISIT (OUTPATIENT)
Dept: ORTHOPEDIC SURGERY | Age: 87
End: 2022-09-02
Payer: MEDICARE

## 2022-09-02 VITALS — RESPIRATION RATE: 14 BRPM | HEIGHT: 71 IN | BODY MASS INDEX: 21.98 KG/M2 | WEIGHT: 157 LBS

## 2022-09-02 DIAGNOSIS — Z96.649 STATUS POST HIP HEMIARTHROPLASTY: Primary | ICD-10-CM

## 2022-09-02 PROCEDURE — G8427 DOCREV CUR MEDS BY ELIG CLIN: HCPCS | Performed by: ORTHOPAEDIC SURGERY

## 2022-09-02 PROCEDURE — 1123F ACP DISCUSS/DSCN MKR DOCD: CPT | Performed by: ORTHOPAEDIC SURGERY

## 2022-09-02 PROCEDURE — 99212 OFFICE O/P EST SF 10 MIN: CPT | Performed by: ORTHOPAEDIC SURGERY

## 2022-09-02 PROCEDURE — G8420 CALC BMI NORM PARAMETERS: HCPCS | Performed by: ORTHOPAEDIC SURGERY

## 2022-09-02 PROCEDURE — 1036F TOBACCO NON-USER: CPT | Performed by: ORTHOPAEDIC SURGERY

## 2022-09-02 NOTE — PROGRESS NOTES
Procedure: Right hip hemiarthroplasty  Date of procedure: 3/2/2022    HPI: Mr. Booker Christy is an 80year-old who is approximately 6 months status post the aforementioned procedure. He is doing well at this time indicating that he has no pain. He is ambulating with a cane. Per his wife when he is doing his exercises on the bed he she has noted some limb length inequality and states that he walks with a little bit of a lurch. Physical examination:  Evaluation of patient's right hip and lower extremity demonstrates intact skin. He has good pain-free range of motion and 5/5 muscle strength with resisted hip flexion, extension, abduction and abduction. Imaging studies: An AP of the pelvis as well as AP and lateral x-ray views of the right hip completed on 9/2/2022 were reviewed independently demonstrating a cemented right total hip arthroplasty in acceptable alignment without obvious fracture, dislocation or subluxation. No loosening noted. Impression and plan: Mr. Booker Christy is an 80year-old approximately 6 months status post right hip hemiarthroplasty. He is doing fairly well at this time. He has no complaints but has noticed a little bit of a limb length inequality. Reviewing the images today demonstrate him to have likely shorter right lower extremity. I had a discussion with the patient and his wife today about this. I recommended trying a shoe insert to see if this helps correct the gait pattern that has been observed. If he still having issues with this he is to notify me and we can have him be assessed for needed alteration to his shoe. In the meantime he was encouraged to keep up with his exercises and may continue weightbearing as tolerated using his cane and I will see him back for reevaluation in another 6 months.

## (undated) DEVICE — SOLUTION IRRIG 1000ML STRL H2O USP PLAS POUR BTL

## (undated) DEVICE — Device

## (undated) DEVICE — GLOVE ORANGE PI 7 1/2   MSG9075

## (undated) DEVICE — 2108 SERIES SAGITTAL BLADE, NO OFFSET  (24.8 X 1.32 X 87.3MM)

## (undated) DEVICE — 1010 S-DRAPE TOWEL DRAPE 10/BX: Brand: STERI-DRAPE™

## (undated) DEVICE — BANDAGE COBAN 6 IN WND 6INX5YD FOAM

## (undated) DEVICE — 4-PORT MANIFOLD: Brand: NEPTUNE 2

## (undated) DEVICE — INTENT TO BE USED WITH SUTURE MATERIAL FOR TISSUE CLOSURE: Brand: RICHARD-ALLAN® NEEDLE 1/2 CIRCLE TAPER

## (undated) DEVICE — ETHIBOND EXCEL SUT 30 INCHES75CM 2 GRN

## (undated) DEVICE — DRAPE,HIP,W/POUCHES,STERILE: Brand: MEDLINE

## (undated) DEVICE — SUTURE VCRL + SZ 2-0 L27IN ABSRB CLR CT-1 1/2 CIR TAPERCUT VCP259H

## (undated) DEVICE — SUTURE VCRL + SZ 1 L36IN ABSRB UD L36MM CT-1 1/2 CIR VCP947H

## (undated) DEVICE — YANKAUER,OPEN TIP,W/O VENT,STERILE: Brand: MEDLINE INDUSTRIES, INC.

## (undated) DEVICE — DRESSING,GAUZE,XEROFORM,CURAD,1"X8",ST: Brand: CURAD

## (undated) DEVICE — SOLUTION IRRIG 3000ML 0.9% SOD CHL USP UROMATIC PLAS CONT

## (undated) DEVICE — 3M™ STERI-DRAPE™ U-DRAPE 1015: Brand: STERI-DRAPE™

## (undated) DEVICE — KIT BNE CEM PREP FEM QUIK-USE 1 PER CA

## (undated) DEVICE — GLOVE ORANGE PI 7   MSG9070

## (undated) DEVICE — GAUZE,SPONGE,FLUFF,6"X6.75",STRL,5/TRAY: Brand: MEDLINE

## (undated) DEVICE — SUTURE VCRL SZ 0 L36IN ABSRB UD CT-1 L36MM 1/2 CIR TAPR PNT VCP946H

## (undated) DEVICE — NEPTUNE E-SEP SMOKE EVACUATION PENCIL, COATED, 70MM BLADE, PUSH BUTTON SWITCH: Brand: NEPTUNE E-SEP

## (undated) DEVICE — MERCY HEALTH ST CHARLES: Brand: MEDLINE INDUSTRIES, INC.

## (undated) DEVICE — DRESSING TRNSPAR W5XL4.5IN FLM SHT SEMIPERMEABLE WIND

## (undated) DEVICE — SOLUTION IRRIG 1000ML 0.9% SOD CHL USP POUR PLAS BTL